# Patient Record
Sex: FEMALE | Race: WHITE | NOT HISPANIC OR LATINO | Employment: FULL TIME | URBAN - METROPOLITAN AREA
[De-identification: names, ages, dates, MRNs, and addresses within clinical notes are randomized per-mention and may not be internally consistent; named-entity substitution may affect disease eponyms.]

---

## 2017-02-13 ENCOUNTER — HOSPITAL ENCOUNTER (OUTPATIENT)
Dept: FAMILY MEDICINE CLINIC | Facility: CLINIC | Age: 67
Setting detail: SPECIMEN
Discharge: HOME | End: 2017-02-13
Attending: FAMILY MEDICINE | Admitting: FAMILY MEDICINE

## 2017-02-13 LAB
ALBUMIN SERPL-MCNC: 3.8 G/DL (ref 3.5–4.8)
ALBUMIN/GLOB SERPL: 1.3 {RATIO} (ref 1–1.7)
ALP SERPL-CCNC: 88 IU/L (ref 32–91)
ALT SERPL-CCNC: 19 IU/L (ref 14–54)
ANION GAP SERPL CALC-SCNC: 10.9 MMOL/L (ref 10–20)
AST SERPL-CCNC: 21 IU/L (ref 15–41)
BILIRUB SERPL-MCNC: 0.7 MG/DL (ref 0.3–1.2)
BUN SERPL-MCNC: 13 MG/DL (ref 8–20)
BUN/CREAT SERPL: 21.7 (ref 5.4–26.2)
CALCIUM SERPL-MCNC: 9.5 MG/DL (ref 8.9–10.3)
CHLORIDE SERPL-SCNC: 105 MMOL/L (ref 101–111)
CHOLEST SERPL-MCNC: 153 MG/DL
CHOLEST/HDLC SERPL: 2.1 {RATIO}
CONV CO2: 29 MMOL/L (ref 22–32)
CONV LDL CHOLESTEROL DIRECT: 62 MG/DL (ref 0–100)
CONV TOTAL PROTEIN: 6.7 G/DL (ref 6.1–7.9)
CREAT UR-MCNC: 0.6 MG/DL (ref 0.4–1)
GLOBULIN UR ELPH-MCNC: 2.9 G/DL (ref 2.5–3.8)
GLUCOSE SERPL-MCNC: 156 MG/DL (ref 65–99)
HDLC SERPL-MCNC: 72 MG/DL
LDLC/HDLC SERPL: 0.9 {RATIO}
LIPID INTERPRETATION: NORMAL
POTASSIUM SERPL-SCNC: 3.9 MMOL/L (ref 3.6–5.1)
SODIUM SERPL-SCNC: 141 MMOL/L (ref 136–144)
TRIGL SERPL-MCNC: 84 MG/DL
VLDLC SERPL CALC-MCNC: 19.3 MG/DL

## 2017-08-25 ENCOUNTER — HOSPITAL ENCOUNTER (OUTPATIENT)
Dept: FAMILY MEDICINE CLINIC | Facility: CLINIC | Age: 67
Setting detail: SPECIMEN
Discharge: HOME | End: 2017-08-25
Attending: FAMILY MEDICINE | Admitting: FAMILY MEDICINE

## 2017-08-25 LAB
ALBUMIN SERPL-MCNC: 3.7 G/DL (ref 3.5–4.8)
ALBUMIN/GLOB SERPL: 1.1 {RATIO} (ref 1–1.7)
ALP SERPL-CCNC: 86 IU/L (ref 32–91)
ALT SERPL-CCNC: 18 IU/L (ref 14–54)
ANION GAP SERPL CALC-SCNC: 16.3 MMOL/L (ref 10–20)
AST SERPL-CCNC: 20 IU/L (ref 15–41)
BILIRUB SERPL-MCNC: 1 MG/DL (ref 0.3–1.2)
BUN SERPL-MCNC: 11 MG/DL (ref 8–20)
BUN/CREAT SERPL: 18.3 (ref 5.4–26.2)
CALCIUM SERPL-MCNC: 9.8 MG/DL (ref 8.9–10.3)
CHLORIDE SERPL-SCNC: 104 MMOL/L (ref 101–111)
CHOLEST SERPL-MCNC: 126 MG/DL
CHOLEST/HDLC SERPL: 2 {RATIO}
CONV CO2: 26 MMOL/L (ref 22–32)
CONV LDL CHOLESTEROL DIRECT: 52 MG/DL (ref 0–100)
CONV TOTAL PROTEIN: 7 G/DL (ref 6.1–7.9)
CREAT UR-MCNC: 0.6 MG/DL (ref 0.4–1)
GLOBULIN UR ELPH-MCNC: 3.3 G/DL (ref 2.5–3.8)
GLUCOSE SERPL-MCNC: 166 MG/DL (ref 65–99)
HDLC SERPL-MCNC: 62 MG/DL
LDLC/HDLC SERPL: 0.8 {RATIO}
LIPID INTERPRETATION: NORMAL
POTASSIUM SERPL-SCNC: 4.3 MMOL/L (ref 3.6–5.1)
SODIUM SERPL-SCNC: 142 MMOL/L (ref 136–144)
TRIGL SERPL-MCNC: 121 MG/DL
VLDLC SERPL CALC-MCNC: 12 MG/DL

## 2018-03-02 ENCOUNTER — HOSPITAL ENCOUNTER (OUTPATIENT)
Dept: FAMILY MEDICINE CLINIC | Facility: CLINIC | Age: 68
Setting detail: SPECIMEN
Discharge: HOME | End: 2018-03-02
Attending: FAMILY MEDICINE | Admitting: FAMILY MEDICINE

## 2018-03-02 LAB
AZTREONAM SUSC ISLT: NORMAL
BACTERIA ISLT: NORMAL
BACTERIA SPEC AEROBE CULT: NORMAL
CEFAZOLIN SUSC ISLT: NORMAL
CEFEPIME SUSC ISLT: NORMAL
CEFTRIAXONE SUSC ISLT: NORMAL
CIPROFLOXACIN SUSC ISLT: NORMAL
COLONY COUNT: NORMAL
ERTAPENEM SUSC ISLT: NORMAL
LEVOFLOXACIN SUSC ISLT: NORMAL
Lab: NORMAL
MEROPENEM SUSC ISLT: NORMAL
MICRO REPORT STATUS: NORMAL
NITROFURANTOIN SUSC ISLT: NORMAL
PIP+TAZO SUSC ISLT: NORMAL
SPECIMEN SOURCE: NORMAL
SUSC METH SPEC: NORMAL
TETRACYCLINE SUSC ISLT: NORMAL
TOBRAMYCIN SUSC ISLT: NORMAL
TRIMETHOPRIM/SULFA: NORMAL

## 2018-08-20 ENCOUNTER — HOSPITAL ENCOUNTER (OUTPATIENT)
Dept: FAMILY MEDICINE CLINIC | Facility: CLINIC | Age: 68
Setting detail: SPECIMEN
Discharge: HOME | End: 2018-08-20
Attending: FAMILY MEDICINE | Admitting: FAMILY MEDICINE

## 2018-08-20 LAB
ALBUMIN SERPL-MCNC: 3.7 G/DL (ref 3.5–4.8)
ALBUMIN/GLOB SERPL: 1.1 {RATIO} (ref 1–1.7)
ALP SERPL-CCNC: 87 IU/L (ref 32–91)
ALT SERPL-CCNC: 19 IU/L (ref 14–54)
ANION GAP SERPL CALC-SCNC: 13.8 MMOL/L (ref 10–20)
AST SERPL-CCNC: 22 IU/L (ref 15–41)
BILIRUB SERPL-MCNC: 0.9 MG/DL (ref 0.3–1.2)
BUN SERPL-MCNC: 10 MG/DL (ref 8–20)
BUN/CREAT SERPL: 16.7 (ref 5.4–26.2)
CALCIUM SERPL-MCNC: 9.4 MG/DL (ref 8.9–10.3)
CHLORIDE SERPL-SCNC: 100 MMOL/L (ref 101–111)
CHOLEST SERPL-MCNC: 140 MG/DL
CHOLEST/HDLC SERPL: 2.2 {RATIO}
CONV CO2: 28 MMOL/L (ref 22–32)
CONV LDL CHOLESTEROL DIRECT: 64 MG/DL (ref 0–100)
CONV TOTAL PROTEIN: 7.2 G/DL (ref 6.1–7.9)
CREAT UR-MCNC: 0.6 MG/DL (ref 0.4–1)
GLOBULIN UR ELPH-MCNC: 3.5 G/DL (ref 2.5–3.8)
GLUCOSE SERPL-MCNC: 170 MG/DL (ref 65–99)
HDLC SERPL-MCNC: 63 MG/DL
LDLC/HDLC SERPL: 1 {RATIO}
LIPID INTERPRETATION: NORMAL
POTASSIUM SERPL-SCNC: 3.8 MMOL/L (ref 3.6–5.1)
SODIUM SERPL-SCNC: 138 MMOL/L (ref 136–144)
TRIGL SERPL-MCNC: 111 MG/DL
VLDLC SERPL CALC-MCNC: 13.2 MG/DL

## 2019-02-15 ENCOUNTER — HOSPITAL ENCOUNTER (OUTPATIENT)
Dept: FAMILY MEDICINE CLINIC | Facility: CLINIC | Age: 69
Setting detail: SPECIMEN
Discharge: HOME | End: 2019-02-15
Attending: FAMILY MEDICINE | Admitting: FAMILY MEDICINE

## 2019-02-15 LAB
ALBUMIN SERPL-MCNC: 3.8 G/DL (ref 3.5–4.8)
ALBUMIN/GLOB SERPL: 1.2 {RATIO} (ref 1–1.7)
ALP SERPL-CCNC: 80 IU/L (ref 32–91)
ALT SERPL-CCNC: 22 IU/L (ref 14–54)
ANION GAP SERPL CALC-SCNC: 13.6 MMOL/L (ref 10–20)
AST SERPL-CCNC: 26 IU/L (ref 15–41)
BILIRUB SERPL-MCNC: 0.8 MG/DL (ref 0.3–1.2)
BUN SERPL-MCNC: 9 MG/DL (ref 8–20)
BUN/CREAT SERPL: 12.9 (ref 5.4–26.2)
CALCIUM SERPL-MCNC: 9.3 MG/DL (ref 8.9–10.3)
CHLORIDE SERPL-SCNC: 103 MMOL/L (ref 101–111)
CHOLEST SERPL-MCNC: 143 MG/DL
CHOLEST/HDLC SERPL: 2.2 {RATIO}
CONV CO2: 25 MMOL/L (ref 22–32)
CONV LDL CHOLESTEROL DIRECT: 65 MG/DL (ref 0–100)
CONV TOTAL PROTEIN: 7 G/DL (ref 6.1–7.9)
CREAT UR-MCNC: 0.7 MG/DL (ref 0.4–1)
GLOBULIN UR ELPH-MCNC: 3.2 G/DL (ref 2.5–3.8)
GLUCOSE SERPL-MCNC: 156 MG/DL (ref 65–99)
HDLC SERPL-MCNC: 65 MG/DL
LDLC/HDLC SERPL: 1 {RATIO}
LIPID INTERPRETATION: NORMAL
POTASSIUM SERPL-SCNC: 3.6 MMOL/L (ref 3.6–5.1)
SODIUM SERPL-SCNC: 138 MMOL/L (ref 136–144)
TRIGL SERPL-MCNC: 126 MG/DL
VLDLC SERPL CALC-MCNC: 12.7 MG/DL

## 2019-08-16 ENCOUNTER — OFFICE VISIT (OUTPATIENT)
Dept: FAMILY MEDICINE CLINIC | Facility: CLINIC | Age: 69
End: 2019-08-16

## 2019-08-16 VITALS
BODY MASS INDEX: 36.57 KG/M2 | SYSTOLIC BLOOD PRESSURE: 192 MMHG | DIASTOLIC BLOOD PRESSURE: 92 MMHG | WEIGHT: 233 LBS | HEIGHT: 67 IN | TEMPERATURE: 97.7 F | OXYGEN SATURATION: 100 % | HEART RATE: 91 BPM

## 2019-08-16 DIAGNOSIS — J01.00 ACUTE NON-RECURRENT MAXILLARY SINUSITIS: Primary | ICD-10-CM

## 2019-08-16 DIAGNOSIS — E11.8 DISORDER ASSOCIATED WITH TYPE 2 DIABETES MELLITUS (HCC): ICD-10-CM

## 2019-08-16 DIAGNOSIS — I10 HYPERTENSION, UNSPECIFIED TYPE: ICD-10-CM

## 2019-08-16 PROBLEM — Z00.00 ENCOUNTER FOR GENERAL ADULT MEDICAL EXAMINATION WITHOUT ABNORMAL FINDINGS: Status: ACTIVE | Noted: 2017-08-25

## 2019-08-16 PROBLEM — K21.9 GASTROESOPHAGEAL REFLUX DISEASE: Status: ACTIVE | Noted: 2019-08-16

## 2019-08-16 LAB
ALBUMIN SERPL-MCNC: 4 G/DL (ref 3.5–4.8)
ALBUMIN/GLOB SERPL: 1.5 G/DL (ref 1–1.7)
ALP SERPL-CCNC: 70 U/L (ref 32–91)
ALT SERPL W P-5'-P-CCNC: 14 U/L (ref 14–54)
ANION GAP SERPL CALCULATED.3IONS-SCNC: 17 MMOL/L (ref 5–15)
ARTICHOKE IGE QN: 59 MG/DL (ref 0–100)
AST SERPL-CCNC: 18 U/L (ref 15–41)
BILIRUB SERPL-MCNC: 1 MG/DL (ref 0.3–1.2)
BUN BLD-MCNC: 9 MG/DL (ref 8–20)
BUN/CREAT SERPL: 15 (ref 5.4–26.2)
CALCIUM SPEC-SCNC: 9.1 MG/DL (ref 8.9–10.3)
CHLORIDE SERPL-SCNC: 102 MMOL/L (ref 101–111)
CHOLEST SERPL-MCNC: 136 MG/DL
CO2 SERPL-SCNC: 24 MMOL/L (ref 22–32)
CREAT BLD-MCNC: 0.6 MG/DL (ref 0.4–1)
GFR SERPL CREATININE-BSD FRML MDRD: 99 ML/MIN/1.73
GLOBULIN UR ELPH-MCNC: 2.7 GM/DL (ref 2.5–3.8)
GLUCOSE BLD-MCNC: 173 MG/DL (ref 65–99)
HBA1C MFR BLD: 6.5 % (ref 3.5–5.6)
HDLC SERPL QL: 2.09
HDLC SERPL-MCNC: 65 MG/DL
LDLC/HDLC SERPL: 0.74 {RATIO}
POTASSIUM BLD-SCNC: 4 MMOL/L (ref 3.6–5.1)
PROT SERPL-MCNC: 6.7 G/DL (ref 6.1–7.9)
SODIUM BLD-SCNC: 139 MMOL/L (ref 136–144)
TRIGL SERPL-MCNC: 113 MG/DL
VLDLC SERPL-MCNC: 22.6 MG/DL

## 2019-08-16 PROCEDURE — 80061 LIPID PANEL: CPT | Performed by: FAMILY MEDICINE

## 2019-08-16 PROCEDURE — 80053 COMPREHEN METABOLIC PANEL: CPT | Performed by: FAMILY MEDICINE

## 2019-08-16 PROCEDURE — 83036 HEMOGLOBIN GLYCOSYLATED A1C: CPT | Performed by: FAMILY MEDICINE

## 2019-08-16 PROCEDURE — 36415 COLL VENOUS BLD VENIPUNCTURE: CPT | Performed by: FAMILY MEDICINE

## 2019-08-16 PROCEDURE — 99214 OFFICE O/P EST MOD 30 MIN: CPT | Performed by: FAMILY MEDICINE

## 2019-08-16 RX ORDER — CARVEDILOL 12.5 MG/1
12.5 TABLET ORAL 2 TIMES DAILY WITH MEALS
Qty: 180 TABLET | Refills: 3 | Status: SHIPPED | OUTPATIENT
Start: 2019-08-16 | End: 2020-06-26

## 2019-08-16 RX ORDER — ATORVASTATIN CALCIUM 10 MG/1
TABLET, FILM COATED ORAL
COMMUNITY
Start: 2018-02-07 | End: 2020-01-14

## 2019-08-16 RX ORDER — LANCETS
EACH MISCELLANEOUS
COMMUNITY
Start: 2019-02-08 | End: 2020-06-12

## 2019-08-16 RX ORDER — CARVEDILOL 6.25 MG/1
TABLET ORAL EVERY 12 HOURS
COMMUNITY
Start: 2018-08-20 | End: 2019-08-16

## 2019-08-16 RX ORDER — GUAIFENESIN AND CODEINE PHOSPHATE 100; 10 MG/5ML; MG/5ML
5 SOLUTION ORAL 4 TIMES DAILY PRN
Qty: 180 ML | Refills: 0 | Status: SHIPPED | OUTPATIENT
Start: 2019-08-16 | End: 2020-01-06

## 2019-08-16 RX ORDER — BLOOD-GLUCOSE METER
EACH MISCELLANEOUS
COMMUNITY
Start: 2017-07-26 | End: 2020-06-19 | Stop reason: SDUPTHER

## 2019-08-16 RX ORDER — CEFDINIR 300 MG/1
300 CAPSULE ORAL 2 TIMES DAILY
Qty: 20 CAPSULE | Refills: 0 | Status: SHIPPED | OUTPATIENT
Start: 2019-08-16 | End: 2020-01-06

## 2019-08-16 NOTE — PROGRESS NOTES
Subjective   Chief Complaint   Patient presents with   • Hypertension     6 mos f/u     Reyna Castano is a 69 y.o. female.     Hypertension   This is a chronic problem. Associated symptoms include headaches. Pertinent negatives include no blurred vision, chest pain, palpitations, peripheral edema or shortness of breath. Current antihypertension treatment includes beta blockers. The current treatment provides mild improvement. There are no compliance problems.  There is no history of angina or heart failure.   Sinusitis   This is a new problem. The current episode started in the past 7 days. The problem has been gradually worsening since onset. The maximum temperature recorded prior to her arrival was 100.4 - 100.9 F. The fever has been present for 1 to 2 days. The pain is moderate. Associated symptoms include chills, coughing, headaches, sinus pressure and a sore throat. Pertinent negatives include no ear pain, shortness of breath, sneezing or swollen glands.   Diabetes   She presents for her follow-up diabetic visit. She has type 2 diabetes mellitus. Her disease course has been improving. Hypoglycemia symptoms include headaches. Pertinent negatives for hypoglycemia include no dizziness. Pertinent negatives for diabetes include no blurred vision, no chest pain and no polyuria. There are no hypoglycemic complications. There are no diabetic complications. Current diabetic treatment includes oral agent (dual therapy). She is compliant with treatment all of the time. Her weight is stable.      No past medical history on file.  No past surgical history on file.  Allergies   Allergen Reactions   • Erythromycin Diarrhea   • Penicillin G Unknown (See Comments)     Social History     Socioeconomic History   • Marital status:      Spouse name: Not on file   • Number of children: Not on file   • Years of education: Not on file   • Highest education level: Not on file   Tobacco Use   • Smoking status: Never Smoker   •  Smokeless tobacco: Never Used   Substance and Sexual Activity   • Alcohol use: No     Frequency: Never     Comment: caffeine free drinks     Social History     Tobacco Use   Smoking Status Never Smoker   Smokeless Tobacco Never Used       family history is not on file.  Current Outpatient Medications on File Prior to Visit   Medication Sig Dispense Refill   • ACCU-CHEK SOFTCLIX LANCETS lancets ACCU-CHEK SOFTCLIX LANCETS     • atorvastatin (LIPITOR) 10 MG tablet LIPITOR 10 MG TABS     • Blood Glucose Monitoring Suppl (ACCU-CHEK JOHN PLUS) w/Device kit ACCU-CHEK JOHN PLUS w/Device KIT     • glucose blood (ACCU-CHEK JOHN PLUS) test strip ACCU-CHEK JOHN PLUS STRP     • linagliptin (TRADJENTA) 5 MG tablet tablet TRADJENTA 5 MG TABS     • metFORMIN (GLUCOPHAGE) 500 MG tablet Every 12 (Twelve) Hours.     • [DISCONTINUED] carvedilol (COREG) 6.25 MG tablet Every 12 (Twelve) Hours.       No current facility-administered medications on file prior to visit.      Patient Active Problem List   Diagnosis   • Disorder associated with type 2 diabetes mellitus (CMS/[facility])   • Encounter for general adult medical examination without abnormal findings   • Gastroesophageal reflux disease   • Hypertension   • Hypokalemia   • Acute non-recurrent maxillary sinusitis       The following portions of the patient's history were reviewed and updated as appropriate: allergies, current medications, past family history, past medical history, past social history, past surgical history and problem list.    Review of Systems   Constitutional: Positive for chills. Negative for fever.   HENT: Positive for sinus pressure and sore throat. Negative for ear pain, sneezing and swollen glands.    Eyes: Negative for blurred vision.   Respiratory: Positive for cough. Negative for shortness of breath.    Cardiovascular: Negative for chest pain and palpitations.   Gastrointestinal: Negative for abdominal pain.   Endocrine: Negative for polyuria.   Skin:  "Negative for rash.   Neurological: Negative for dizziness and headache.   Hematological: Negative for adenopathy.   Psychiatric/Behavioral: Negative for depressed mood.       Objective   BP (!) 192/92 (BP Location: Left arm, Patient Position: Sitting, Cuff Size: Adult) Comment (Cuff Size): forearm  Pulse 91   Temp 97.7 °F (36.5 °C) (Oral)   Ht 168.9 cm (66.5\")   Wt 106 kg (233 lb)   SpO2 100%   BMI 37.04 kg/m²   Physical Exam   Constitutional: She is oriented to person, place, and time. She appears well-developed. No distress.   HENT:   Head: Normocephalic.   Mouth/Throat: Oropharynx is clear and moist.   Eyes: Conjunctivae and lids are normal.   Neck: Trachea normal. No thyroid mass and no thyromegaly present.   Cardiovascular: Normal rate, regular rhythm and normal heart sounds.   Pulmonary/Chest: Effort normal and breath sounds normal.   Lymphadenopathy:     She has no cervical adenopathy.   Neurological: She is alert and oriented to person, place, and time.   Skin: Skin is warm and dry.   Psychiatric: She has a normal mood and affect. Her speech is normal and behavior is normal. She is attentive.       No visits with results within 1 Week(s) from this visit.   Latest known visit with results is:   Hospital Outpatient Visit on 02/15/2019   Component Date Value Ref Range Status   • Total Cholesterol 02/15/2019 143  <200 mg/dL Final   • Triglycerides 02/15/2019 126  <150 mg/dL Final   • HDL Cholesterol 02/15/2019 65  >39 mg/dL Final   • LDL Cholesterol  02/15/2019 65  0 - 100 mg/dL Final   • VLDL Cholesterol 02/15/2019 12.7  <21 mg/dL Final   • LDL/HDL Ratio 02/15/2019 1.0   Final   • Chol/HDL Ratio 02/15/2019 2.2   Final    (SEE BELOW)  The following guidelines have been recommended by the NCEP for -    • Lipid Interpretation 02/15/2019 Total Cholesterol, Total Triglycerides, LDL Cholesterol,and HDL Cholesterol:    Final   • Lipid Interpretation 02/15/2019 TOTAL CHOLESTEROL                    HDL CHOLESTEROL "  Desirable:              Final   • Lipid Interpretation 02/15/2019 <200 mg/dL     Low HDL:            <40 mg/dL  Borderline High:   200-239 mg/dL    Final   • Lipid Interpretation 02/15/2019    Normal:           40-60 mg/dL  High:           > or = 240 mg/dL       Final   • Lipid Interpretation 02/15/2019 Desirable:          >60 mg/dL  TOTAL TRIGLYCERIDE                   LDL   Final   • Lipid Interpretation 02/15/2019 CHOLESTEROL  Normal:               <150 mg/dL     Optimal:           <100 mg/dL   Final   • Lipid Interpretation 02/15/2019  Borderline High:   150-199 mg/dL     Low Risk:       100-129 mg/dL  High:        Final   • Lipid Interpretation 02/15/2019         200-499 mg/dL     Borderline High:130-159 mg/dL  Very High:      > or =   Final   • Lipid Interpretation 02/15/2019 500 mg/dL     High:           160-189 mg/dL                                       Final   • Lipid Interpretation 02/15/2019   Very High:   > or = 190 mg/dL  The following ratios of LDL to HDL and Total   Final   • Lipid Interpretation 02/15/2019 Cholesterol to HDL are for information only:  LDL/HDL RATIO                       Final   • Lipid Interpretation 02/15/2019    TOTAL CHOLESTEROL/HDL RATIO  Desirable:              <5           Low Risk:    Final   • Lipid Interpretation 02/15/2019      3.3-4.4   Optimal:        < or = 3.5           Average Risk:   4.4-7.1       Final   • Lipid Interpretation 02/15/2019                                    Medium Risk:    7.1-11                         Final   • Lipid Interpretation 02/15/2019                   High Risk:         >11      Final   • Sodium 02/15/2019 138  136 - 144 mmol/L Final   • Potassium 02/15/2019 3.6  3.6 - 5.1 mmol/L Final   • Chloride 02/15/2019 103  101 - 111 mmol/L Final   • CO2 02/15/2019 25  22 - 32 mmol/L Final   • Glucose 02/15/2019 156* 65 - 99 mg/dL Final   • BUN 02/15/2019 9  8 - 20 mg/dL Final   • Creatinine 02/15/2019 0.7  0.4 - 1.0 mg/dl Final   • Calcium  02/15/2019 9.3  8.9 - 10.3 mg/dL Final   • Total Protein 02/15/2019 7.0  6.1 - 7.9 g/dL Final   • Albumin 02/15/2019 3.8  3.5 - 4.8 g/dL Final   • Total Bilirubin 02/15/2019 0.8  0.3 - 1.2 mg/dL Final   • Alkaline Phosphatase 02/15/2019 80  32 - 91 IU/L Final   • AST (SGOT) 02/15/2019 26  15 - 41 IU/L Final   • ALT (SGPT) 02/15/2019 22  14 - 54 IU/L Final   • Anion Gap 02/15/2019 13.6  10 - 20 Final   • BUN/Creatinine Ratio 02/15/2019 12.9  5.4 - 26.2 Final   • GFR MDRD Non  02/15/2019 >60  >60 mL/min/1.73m2 Final   • GFR MDRD  02/15/2019 >60  >60 mL/min/1.73m2 Final   • Globulin 02/15/2019 3.2  2.5 - 3.8 G/dL Final   • A/G Ratio 02/15/2019 1.2  1.0 - 1.7 Final           Assessment/Plan   Problems Addressed this Visit        Cardiovascular and Mediastinum    Hypertension    Relevant Medications    carvedilol (COREG) 12.5 MG tablet    Other Relevant Orders    Hemoglobin A1c    Lipid Panel    Comprehensive Metabolic Panel    MicroAlbumin, Urine, Random - Urine, Clean Catch       Respiratory    Acute non-recurrent maxillary sinusitis - Primary    Relevant Medications    cefdinir (OMNICEF) 300 MG capsule    guaifenesin-codeine (GUAIFENESIN AC) 100-10 MG/5ML liquid       Endocrine    Disorder associated with type 2 diabetes mellitus (CMS/HCC)    Relevant Medications    linagliptin (TRADJENTA) 5 MG tablet tablet    metFORMIN (GLUCOPHAGE) 500 MG tablet    Other Relevant Orders    Hemoglobin A1c    Lipid Panel    Comprehensive Metabolic Panel    MicroAlbumin, Urine, Random - Urine, Clean Catch          Reyna was seen today for hypertension.    Diagnoses and all orders for this visit:    Acute non-recurrent maxillary sinusitis  -     cefdinir (OMNICEF) 300 MG capsule; Take 1 capsule by mouth 2 (Two) Times a Day.  -     guaifenesin-codeine (GUAIFENESIN AC) 100-10 MG/5ML liquid; Take 5 mL by mouth 4 (Four) Times a Day As Needed for Cough.    Hypertension, unspecified type  -     Hemoglobin  A1c  -     Lipid Panel  -     Comprehensive Metabolic Panel  -     MicroAlbumin, Urine, Random - Urine, Clean Catch    Disorder associated with type 2 diabetes mellitus (CMS/HCC)  -     Hemoglobin A1c  -     Lipid Panel  -     Comprehensive Metabolic Panel  -     MicroAlbumin, Urine, Random - Urine, Clean Catch    Other orders  -     carvedilol (COREG) 12.5 MG tablet; Take 1 tablet by mouth 2 (Two) Times a Day With Meals.

## 2019-09-20 ENCOUNTER — HOSPITAL ENCOUNTER (EMERGENCY)
Facility: HOSPITAL | Age: 69
Discharge: HOME | End: 2019-09-20
Attending: EMERGENCY MEDICINE | Admitting: EMERGENCY MEDICINE

## 2019-09-20 ENCOUNTER — APPOINTMENT (OUTPATIENT)
Dept: MRI IMAGING | Facility: HOSPITAL | Age: 69
End: 2019-09-20

## 2019-09-20 VITALS
TEMPERATURE: 98 F | HEART RATE: 60 BPM | SYSTOLIC BLOOD PRESSURE: 150 MMHG | RESPIRATION RATE: 16 BRPM | OXYGEN SATURATION: 99 % | DIASTOLIC BLOOD PRESSURE: 78 MMHG | HEIGHT: 67 IN | WEIGHT: 236.11 LBS | BODY MASS INDEX: 37.06 KG/M2

## 2019-09-20 DIAGNOSIS — M54.16 LUMBAR BACK PAIN WITH RADICULOPATHY AFFECTING RIGHT LOWER EXTREMITY: Primary | ICD-10-CM

## 2019-09-20 LAB
BACTERIA UR QL AUTO: ABNORMAL /HPF
BILIRUB UR QL STRIP: NEGATIVE
CLARITY UR: CLEAR
COLOR UR: YELLOW
GLUCOSE UR STRIP-MCNC: ABNORMAL MG/DL
HGB UR QL STRIP.AUTO: ABNORMAL
HYALINE CASTS UR QL AUTO: ABNORMAL /LPF
KETONES UR QL STRIP: NEGATIVE
LEUKOCYTE ESTERASE UR QL STRIP.AUTO: NEGATIVE
NITRITE UR QL STRIP: NEGATIVE
PH UR STRIP.AUTO: 6.5 [PH] (ref 5–8)
PROT UR QL STRIP: ABNORMAL
RBC # UR: ABNORMAL /HPF
REF LAB TEST METHOD: ABNORMAL
SP GR UR STRIP: 1.01 (ref 1–1.03)
SQUAMOUS #/AREA URNS HPF: ABNORMAL /HPF
UROBILINOGEN UR QL STRIP: ABNORMAL
WBC UR QL AUTO: ABNORMAL /HPF

## 2019-09-20 PROCEDURE — 81001 URINALYSIS AUTO W/SCOPE: CPT | Performed by: EMERGENCY MEDICINE

## 2019-09-20 PROCEDURE — 96372 THER/PROPH/DIAG INJ SC/IM: CPT

## 2019-09-20 PROCEDURE — 25010000002 METHYLPREDNISOLONE PER 80 MG: Performed by: EMERGENCY MEDICINE

## 2019-09-20 PROCEDURE — 99284 EMERGENCY DEPT VISIT MOD MDM: CPT

## 2019-09-20 PROCEDURE — 72148 MRI LUMBAR SPINE W/O DYE: CPT

## 2019-09-20 RX ORDER — ONDANSETRON 4 MG/1
4 TABLET, ORALLY DISINTEGRATING ORAL ONCE
Status: COMPLETED | OUTPATIENT
Start: 2019-09-20 | End: 2019-09-20

## 2019-09-20 RX ORDER — HYDROCODONE BITARTRATE AND ACETAMINOPHEN 7.5; 325 MG/1; MG/1
1 TABLET ORAL ONCE
Status: COMPLETED | OUTPATIENT
Start: 2019-09-20 | End: 2019-09-20

## 2019-09-20 RX ORDER — HYDROCODONE BITARTRATE AND ACETAMINOPHEN 5; 325 MG/1; MG/1
1 TABLET ORAL EVERY 6 HOURS PRN
Qty: 12 TABLET | Refills: 0 | Status: SHIPPED | OUTPATIENT
Start: 2019-09-20 | End: 2019-09-30 | Stop reason: SDUPTHER

## 2019-09-20 RX ORDER — METHYLPREDNISOLONE ACETATE 80 MG/ML
80 INJECTION, SUSPENSION INTRA-ARTICULAR; INTRALESIONAL; INTRAMUSCULAR; SOFT TISSUE ONCE
Status: COMPLETED | OUTPATIENT
Start: 2019-09-20 | End: 2019-09-20

## 2019-09-20 RX ADMIN — METHYLPREDNISOLONE ACETATE 80 MG: 80 INJECTION, SUSPENSION INTRA-ARTICULAR; INTRALESIONAL; INTRAMUSCULAR; SOFT TISSUE at 10:17

## 2019-09-20 RX ADMIN — ONDANSETRON 4 MG: 4 TABLET, ORALLY DISINTEGRATING ORAL at 10:17

## 2019-09-20 RX ADMIN — HYDROCODONE BITARTRATE AND ACETAMINOPHEN 1 TABLET: 7.5; 325 TABLET ORAL at 10:17

## 2019-09-20 NOTE — ED PROVIDER NOTES
Subjective   Chief complaint back pain right leg pain    History of present illness 69-year-old female who states that since Monday when she got up and she did some leg lifts which she does every morning for several years she developed pain in her low back into the top of her right leg.  She states is steadily gotten worse since Monday is worse with movement better with rest but continuous moderate severe unrelieved with ibuprofen at home.  No loss of bladder bowel control no fever chills no chest pain no abdominal pain no black or bloody stool dizziness or syncope is constant worse with movement better with rest and down the right leg.            Review of Systems   Constitutional: Negative for chills and fever.   Respiratory: Negative for chest tightness and shortness of breath.    Cardiovascular: Negative for chest pain and leg swelling.   Gastrointestinal: Negative for abdominal pain, blood in stool and vomiting.   Genitourinary: Negative for difficulty urinating and dysuria.   Musculoskeletal: Positive for back pain. Negative for arthralgias.   Neurological: Negative for dizziness and numbness.   Psychiatric/Behavioral: Negative for agitation and behavioral problems.       Past Medical History:   Diagnosis Date   • Diabetes mellitus (CMS/HCC)    • Hypertension    • Injury of back      Previous back surgery  Allergies   Allergen Reactions   • Erythromycin Diarrhea   • Penicillin G Unknown (See Comments)       Past Surgical History:   Procedure Laterality Date   • BACK SURGERY     • COLONOSCOPY     • HYSTERECTOMY         History reviewed. No pertinent family history.    Social History     Socioeconomic History   • Marital status:      Spouse name: Not on file   • Number of children: Not on file   • Years of education: Not on file   • Highest education level: Not on file   Tobacco Use   • Smoking status: Never Smoker   • Smokeless tobacco: Never Used   Substance and Sexual Activity   • Alcohol use: No      Frequency: Never     Comment: caffeine free drinks       Prior to Admission medications    Medication Sig Start Date End Date Taking? Authorizing Provider   ACCU-CHEK SOFTCLIX LANCETS lancets ACCU-CHEK SOFTCLIX LANCETS 2/8/19   Janay Bull MD   atorvastatin (LIPITOR) 10 MG tablet LIPITOR 10 MG TABS 2/7/18   Janay Bull MD   Blood Glucose Monitoring Suppl (ACCU-CHEK JOHN PLUS) w/Device kit ACCU-CHEK JOHN PLUS w/Device KIT 7/26/17   Janay Bull MD   carvedilol (COREG) 12.5 MG tablet Take 1 tablet by mouth 2 (Two) Times a Day With Meals. 8/16/19   Keturah Grewal MD   cefdinir (OMNICEF) 300 MG capsule Take 1 capsule by mouth 2 (Two) Times a Day. 8/16/19   Keturah Grewal MD   glucose blood (ACCU-CHEK JOHN PLUS) test strip ACCU-CHEK JOHN PLUS STRP 9/10/18   Janay Bull MD   guaifenesin-codeine (GUAIFENESIN AC) 100-10 MG/5ML liquid Take 5 mL by mouth 4 (Four) Times a Day As Needed for Cough. 8/16/19   Keturah Grewal MD   linagliptin (TRADJENTA) 5 MG tablet tablet TRADJENTA 5 MG TABS 2/7/18   Janay Bull MD   metFORMIN (GLUCOPHAGE) 500 MG tablet Every 12 (Twelve) Hours. 2/7/18   Janay Bull MD         Objective   Physical Exam  69-year-old awake alert no acute distress HEENT extraocular intact sclera clear mouth clear neck supple no adenopathy lungs clear no retraction no CVA tenderness there is no direct cervical thoracic lumbar spine tenderness palpation because she has some pain in the posterior paralumbar area on the right posterior hip but no step-off or deformity the patient has no rash or signs of infection or signs of septic joint.  Lungs clear no retraction heart rate without murmur and was soft no tenderness pulsatile mass extremities pulses are equal throughout upper and lower extremities no edema cords or Homans sign or evidence of DVT.  Toes up-and-down including big toe dorsiflex plantar flex at difficulty negative straight leg testing  bilaterally reflexes are 2+ symmetrical both knees and ankles there is no weakness in extremities.  Patient is awake alert and follows commands motor strength normal without focal weakness  Procedures           ED Course      Results for orders placed or performed during the hospital encounter of 09/20/19   Urinalysis With Culture If Indicated - Urine, Random Void   Result Value Ref Range    Color, UA Yellow Yellow, Straw    Appearance, UA Clear Clear    pH, UA 6.5 5.0 - 8.0    Specific Gravity, UA 1.011 1.005 - 1.030    Glucose,  mg/dL (Trace) (A) Negative    Ketones, UA Negative Negative    Bilirubin, UA Negative Negative    Blood, UA Small (1+) (A) Negative    Protein,  mg/dL (2+) (A) Negative    Leuk Esterase, UA Negative Negative    Nitrite, UA Negative Negative    Urobilinogen, UA 0.2 E.U./dL 0.2 - 1.0 E.U./dL   Urinalysis, Microscopic Only - Urine, Random Void   Result Value Ref Range    RBC, UA 0-2 (A) None Seen /HPF    WBC, UA 0-2 (A) None Seen /HPF    Bacteria, UA None Seen None Seen /HPF    Squamous Epithelial Cells, UA None Seen None Seen, 0-2 /HPF    Hyaline Casts, UA 0-2 None Seen /LPF    Methodology Automated Microscopy      Mri Lumbar Spine Without Contrast    Result Date: 9/20/2019   1. L5-S1 osseous fusion. 2. Multilevel degenerative changes in the lumbar spine. Please refer to body of report for full description. 3. 2 mm retrolisthesis L2 on L3. 4 mm retrolisthesis L4 on L5. 4 Advanced degenerative disc and endplate changes at L2-3. 5. Lumbar dextroscoliosis. 6. Moderate canal stenosis at L3-4. 7. Suspected severe left neural foraminal stenosis at C2-3, moderate to severe right neural foraminal stenosis and moderate left neural foraminal stenosis at L3-4, mild to moderate right neural foraminal stenosis at L4-5. 8. Nonspecific T2 hyperintensity right hepatic lobe lesion. Consider correlation to CT or MRI abdomen without with contrast further evaluation.  Electronically Signed By-  Lucila Xie MD On:9/20/2019 12:52 PM This report was finalized on [id number] by Dr. Lucila Xie MD.    Medications   methylPREDNISolone acetate (DEPO-medrol) injection 80 mg (80 mg Intramuscular Given 9/20/19 1017)   HYDROcodone-acetaminophen (NORCO) 7.5-325 MG per tablet 1 tablet (1 tablet Oral Given 9/20/19 1017)   ondansetron ODT (ZOFRAN-ODT) disintegrating tablet 4 mg (4 mg Oral Given 9/20/19 1017)                   MDM  Number of Diagnoses or Management Options  Lumbar back pain with radiculopathy affecting right lower extremity:   Diagnosis management comments: Angella decision making.  Patient had the above exam the patient was given Depo-Medrol 80 mg IM Norco 1 p.o. Zofran 1 p.o. and had the above evaluation.  Urine showed 0-2 red cells otherwise negative.  Patient had MRI which showed that she had degenerative disc disease spinal stenosis postoperative changes.  No acute cord compression.  Patient repeat exam is resting currently.  Inspect was reviewed was negative I did some cough syrup she received last month.  The patient was made aware of the findings we talked about follow-up and need for potentially physical therapy epidural blocks versus conservative management talked about surgical management and referral she will be discharged home for outpatient management I see no evidence of acute intra-abdominal process.  No evidence of any acute cord compression or cauda equina.      Final diagnoses:   Lumbar back pain with radiculopathy affecting right lower extremity              Dinesh Baldwin MD  09/20/19 [id number]

## 2019-09-22 ENCOUNTER — HOSPITAL ENCOUNTER (EMERGENCY)
Facility: HOSPITAL | Age: 69
Discharge: HOME | End: 2019-09-22
Admitting: EMERGENCY MEDICINE

## 2019-09-22 VITALS
DIASTOLIC BLOOD PRESSURE: 70 MMHG | BODY MASS INDEX: 37.04 KG/M2 | HEIGHT: 67 IN | HEART RATE: 79 BPM | TEMPERATURE: 97.8 F | SYSTOLIC BLOOD PRESSURE: 170 MMHG | WEIGHT: 236 LBS | RESPIRATION RATE: 16 BRPM | OXYGEN SATURATION: 100 %

## 2019-09-22 DIAGNOSIS — M54.50 ACUTE RIGHT-SIDED LOW BACK PAIN WITHOUT SCIATICA: Primary | ICD-10-CM

## 2019-09-22 DIAGNOSIS — M79.604 RIGHT LEG PAIN: ICD-10-CM

## 2019-09-22 PROCEDURE — 25010000002 MORPHINE PER 10 MG: Performed by: EMERGENCY MEDICINE

## 2019-09-22 PROCEDURE — 99284 EMERGENCY DEPT VISIT MOD MDM: CPT

## 2019-09-22 PROCEDURE — 96372 THER/PROPH/DIAG INJ SC/IM: CPT

## 2019-09-22 RX ORDER — MELOXICAM 15 MG/1
15 TABLET ORAL DAILY
Qty: 30 TABLET | Refills: 0 | Status: SHIPPED | OUTPATIENT
Start: 2019-09-22 | End: 2020-01-06

## 2019-09-22 RX ORDER — MORPHINE SULFATE 4 MG/ML
4 INJECTION, SOLUTION INTRAMUSCULAR; INTRAVENOUS ONCE
Status: COMPLETED | OUTPATIENT
Start: 2019-09-22 | End: 2019-09-22

## 2019-09-22 RX ORDER — TRAMADOL HYDROCHLORIDE 50 MG/1
50 TABLET ORAL EVERY 6 HOURS PRN
Qty: 12 TABLET | Refills: 0 | Status: SHIPPED | OUTPATIENT
Start: 2019-09-22 | End: 2020-01-06

## 2019-09-22 RX ADMIN — MORPHINE SULFATE 4 MG: 4 INJECTION INTRAVENOUS at 09:48

## 2019-09-22 NOTE — DISCHARGE INSTRUCTIONS
Patient was advised to take tramadol as needed for pain.  Do not mix with hydrocodone or other pain medication.  Patient was also given meloxicam for anti-inflammatory.  Do not mix with ibuprofen, Aleve, Advil, naproxen or diclofenac.    Follow-up with primary care tomorrow for worsening concerns and management.  Call neuro spine surgery of choice to schedule an appointment this week for further evaluation and management.    Return to the ER for new or worsening symptoms, increased lower back or right leg pain, increased numbness and tingling, loss of bowel bladder or bowel function, nausea, vomiting or fever.

## 2019-09-22 NOTE — ED PROVIDER NOTES
"Subjective   Patient is a 69-year-old female history of diabetes, hypertension, previous back injury and surgery who presents the ER complaining of increased lower back and right leg pain 2 days.  Patient states that she was seen here at [facility] ER 2 days ago for right lower back pain and right leg pain.  She reports that she had an MRI done which showed a \"pinched nerve\".  He states that she was given a prednisone shot and discharged with hydrocodone, but she states that the pain medication is not helping and only makes her sleepy.  She states that she is here today because her pain has increased and it is hard for her to walk at home.  She states that she is planning to follow-up with her primary care tomorrow but has not called yet.  She verbalizes that she has a history of a back surgery at L1-S1 in 1983 but does not know what procedure she had.  She states her pain started a week ago mostly in the right lower back and right leg, mostly on the anterior lateral aspect of her thigh.  She reports no weakness, sensory or motor deficits in either lower extremity, only complaining of increased pain.  She has no other complaints at this time.        History provided by:  Patient      Review of Systems   Constitutional: Negative for fever.   HENT: Negative for congestion, sinus pressure, sore throat and trouble swallowing.    Respiratory: Negative for chest tightness, shortness of breath and wheezing.    Cardiovascular: Negative for chest pain.   Gastrointestinal: Negative for abdominal pain, diarrhea, nausea and vomiting.   Genitourinary: Negative for dysuria.   Musculoskeletal: Positive for arthralgias, back pain and myalgias. Negative for neck pain.        Right sided back pain, bilateral knee pain, right leg pain, mostly anterior-lateral region   Skin: Negative for rash.   Neurological: Negative for dizziness, syncope, weakness, light-headedness and headaches.   Psychiatric/Behavioral: Negative for " behavioral problems.       Past Medical History:   Diagnosis Date   • Diabetes mellitus (CMS/HCC)    • Hypertension    • Injury of back        Allergies   Allergen Reactions   • Erythromycin Diarrhea   • Penicillin G Unknown (See Comments)       Past Surgical History:   Procedure Laterality Date   • BACK SURGERY     • COLONOSCOPY     • HYSTERECTOMY         No family history on file.    Social History     Socioeconomic History   • Marital status:      Spouse name: Not on file   • Number of children: Not on file   • Years of education: Not on file   • Highest education level: Not on file   Tobacco Use   • Smoking status: Never Smoker   • Smokeless tobacco: Never Used   Substance and Sexual Activity   • Alcohol use: No     Frequency: Never     Comment: caffeine free drinks           Objective   Physical Exam   Constitutional: She is oriented to person, place, and time. She appears well-developed and well-nourished. No distress.   Obese   HENT:   Head: Normocephalic and atraumatic.   Mouth/Throat: Oropharynx is clear and moist.   Eyes: EOM are normal. Pupils are equal, round, and reactive to light.   Neck: Normal range of motion. Neck supple.   Cardiovascular: Normal rate, regular rhythm, normal heart sounds and intact distal pulses.   DP pulses intact and equal bilaterally   Pulmonary/Chest: Breath sounds normal. No respiratory distress. She has no wheezes. She exhibits no tenderness.   Abdominal: Soft. She exhibits no distension and no mass. There is no tenderness. There is no guarding.   Musculoskeletal: She exhibits edema and tenderness.   Tenderness to palpation of anterior right thigh.  Tenderness to palpation of right lumbosacral region.  Mild pain with flexion of right hip.  Patient states her range of motion is normal per her baseline  No pain with palpation of foot, ankle, calf or posterior aspect of thigh of bilateral lower extremities.  Homans sign negative   Neurological: She is alert and oriented to  "person, place, and time. She displays normal reflexes. No sensory deficit.   Sensory and motor strength in upper and lower extremities are appropriate, equal bilaterally 5/5.  Patient able to plantar and dorsiflex without pain.   Skin: Skin is warm and dry. Capillary refill takes less than 2 seconds. No rash noted. No erythema.   Venous varicosities bilateral lower extremities.  No erythema, warmth or ecchymosis noted in the calf or thigh.  No masses or nodules noted.  Patient has diffuse edema in bilateral lower extremities but reports no changes or increased swelling per her baseline   Psychiatric: She has a normal mood and affect. Her behavior is normal.   Vitals reviewed.      Procedures           ED Course  ED Course as of Sep 22 1051   Sun Sep 22, 2019   1008 MRI imaging from previous ER visit 2 days ago was reviewed:  IMPRESSION:     1. L5-S1 osseous fusion.  2. Multilevel degenerative changes in the lumbar spine. Please refer to  body of report for full description.  3. 2 mm retrolisthesis L2 on L3. 4 mm retrolisthesis L4 on L5.  4 Advanced degenerative disc and endplate changes at L2-3.  5. Lumbar dextroscoliosis.  6. Moderate canal stenosis at L3-4.  7. Suspected severe left neural foraminal stenosis at C2-3, moderate to  severe right neural foraminal stenosis and moderate left neural  foraminal stenosis at L3-4, mild to moderate right neural foraminal  stenosis at L4-5.  8. Nonspecific T2 hyperintensity right hepatic lobe lesion. Consider  correlation to CT or MRI abdomen without with contrast further  evaluation.     Electronically Signed By-Dr. Lucila Xie MD On:9/20/2019 12:52 PM  This report was finalized on [id number] by Dr. Lucila Xie MD.  [MM]      ED Course User Index  [MM] Debbie Álvarez PA      BP (!) 184/56   Pulse 72   Temp 97.8 °F (36.6 °C) (Oral)   Resp 16   Ht 170.2 cm (67\")   Wt 107 kg (236 lb)   SpO2 98%   BMI 36.96 kg/m²   Labs Reviewed - No data to " display  Medications   Morphine sulfate (PF) injection 4 mg (4 mg Intramuscular Given 9/22/19 0948)     Mri Lumbar Spine Without Contrast    Result Date: 9/20/2019   1. L5-S1 osseous fusion. 2. Multilevel degenerative changes in the lumbar spine. Please refer to body of report for full description. 3. 2 mm retrolisthesis L2 on L3. 4 mm retrolisthesis L4 on L5. 4 Advanced degenerative disc and endplate changes at L2-3. 5. Lumbar dextroscoliosis. 6. Moderate canal stenosis at L3-4. 7. Suspected severe left neural foraminal stenosis at C2-3, moderate to severe right neural foraminal stenosis and moderate left neural foraminal stenosis at L3-4, mild to moderate right neural foraminal stenosis at L4-5. 8. Nonspecific T2 hyperintensity right hepatic lobe lesion. Consider correlation to CT or MRI abdomen without with contrast further evaluation.  Electronically Signed By-Dr. Lucila Xie MD On:9/20/2019 12:52 PM This report was finalized on [id number] by Dr. Lucila Xie MD.                MDM  Number of Diagnoses or Management Options  Acute right-sided low back pain without sciatica:   Right leg pain:   Diagnosis management comments: MEDICAL DECISION  Comorbidities:  Differentials:  ; this list is not all inclusive and does not constitute the entirety of considered causes  Radiology interpretation:  Images reviewed by me and interpreted by radiologist,   MRI Lumbar spine done Friday Sept 20, during ER visit  IMPRESSION:     1. L5-S1 osseous fusion.  2. Multilevel degenerative changes in the lumbar spine. Please refer to  body of report for full description.  3. 2 mm retrolisthesis L2 on L3. 4 mm retrolisthesis L4 on L5.  4 Advanced degenerative disc and endplate changes at L2-3.  5. Lumbar dextroscoliosis.  6. Moderate canal stenosis at L3-4.  7. Suspected severe left neural foraminal stenosis at C2-3, moderate to  severe right neural foraminal stenosis and moderate left neural  foraminal stenosis at L3-4, mild  to moderate right neural foraminal  stenosis at L4-5.  8. Nonspecific T2 hyperintensity right hepatic lobe lesion. Consider  correlation to CT or MRI abdomen without with contrast further  evaluation.     Electronically Signed By-Dr. Lucila Xie MD On:9/20/2019 12:52 PM  This report was finalized on [id number] by Dr. Lucila Xie MD.    Patient was seen and evaluated in the ER today.  Patient is complaining of worsening right lower back pain and right leg pain today, mostly anterior lateral region.  She denies any numbness or tingling.  She denies any sensory or motor deficits or weakness.  Patient was given morphine injection for pain.  She states that this did not seem to help with her pain, only made her drowsy..  Discussed her MRI imaging with her.  Advised patient that there is no other imaging that would be beneficial for her at this time.  Recommended that she follow-up with her primary care provider tomorrow as well as neuro spine, Dr. Alexander, for further evaluation and treatment.  Patient will be discharged with short-term prescription of tramadol as well as meloxicam. Patient was agreeable with this plan. Patient symptoms and MRI imaging was discussed with Dr. Baldwin, who saw the patient during her ER visit 2 days ago, who also agreed with discharge plan.  Patient was also advised to follow-up with primary care provider within the next 4 weeks for blood pressure recheck as she had high blood pressure while here in the ER.  Patient was stable throughout discharge.      Final diagnoses:   Acute right-sided low back pain without sciatica   Right leg pain              Debbie Álvarez PA  09/22/19 9885

## 2019-09-23 ENCOUNTER — TELEPHONE (OUTPATIENT)
Dept: FAMILY MEDICINE CLINIC | Facility: CLINIC | Age: 69
End: 2019-09-23

## 2019-09-23 ENCOUNTER — OFFICE VISIT (OUTPATIENT)
Dept: FAMILY MEDICINE CLINIC | Facility: CLINIC | Age: 69
End: 2019-09-23

## 2019-09-23 VITALS
SYSTOLIC BLOOD PRESSURE: 186 MMHG | DIASTOLIC BLOOD PRESSURE: 84 MMHG | TEMPERATURE: 97.8 F | OXYGEN SATURATION: 97 % | HEART RATE: 88 BPM

## 2019-09-23 DIAGNOSIS — M48.061 NEURAL FORAMINAL STENOSIS OF LUMBAR SPINE: ICD-10-CM

## 2019-09-23 DIAGNOSIS — M43.10 RETROLISTHESIS OF VERTEBRAE: ICD-10-CM

## 2019-09-23 DIAGNOSIS — M48.061 NEURAL FORAMINAL STENOSIS OF LUMBAR SPINE: Primary | ICD-10-CM

## 2019-09-23 DIAGNOSIS — M43.10 RETROLISTHESIS OF VERTEBRAE: Primary | ICD-10-CM

## 2019-09-23 PROCEDURE — 99213 OFFICE O/P EST LOW 20 MIN: CPT | Performed by: FAMILY MEDICINE

## 2019-09-23 PROCEDURE — 99080 SPECIAL REPORTS OR FORMS: CPT | Performed by: FAMILY MEDICINE

## 2019-09-23 RX ORDER — PREDNISONE 10 MG/1
10 TABLET ORAL 2 TIMES DAILY
Qty: 10 TABLET | Refills: 0 | Status: SHIPPED | OUTPATIENT
Start: 2019-09-23 | End: 2020-01-06

## 2019-09-23 NOTE — TELEPHONE ENCOUNTER
Per pt she cannot get into Dr. Ho until Oct. 31st and she cannot wait that long. Is there anyone else that you can refer her to?

## 2019-09-23 NOTE — PROGRESS NOTES
Subjective   Chief Complaint   Patient presents with   • Follow-up     [facility] ER f/u     Reyna Castano is a 69 y.o. female.     She has been to the ER twice since last week.  No specific injury.  She did some routine mild exercises, leg lifts, and thinks it triggered the pain. MRI done in the ER is abnormal. Records reviewed.      Back Pain   This is a new problem. The current episode started in the past 7 days. The problem has been rapidly worsening since onset. The pain is present in the lumbar spine. The quality of the pain is described as shooting. The pain radiates to the right foot. The pain is severe. The pain is the same all the time. The symptoms are aggravated by bending. Pertinent negatives include no abdominal pain, chest pain or fever. She has tried NSAIDs and analgesics for the symptoms. The treatment provided mild relief.      Past Medical History:   Diagnosis Date   • Diabetes mellitus (CMS/HCC)    • Hypertension    • Injury of back      Past Surgical History:   Procedure Laterality Date   • BACK SURGERY     • COLONOSCOPY     • HYSTERECTOMY       Allergies   Allergen Reactions   • Erythromycin Diarrhea   • Penicillin G Unknown (See Comments)     Social History     Socioeconomic History   • Marital status:      Spouse name: Not on file   • Number of children: Not on file   • Years of education: Not on file   • Highest education level: Not on file   Tobacco Use   • Smoking status: Never Smoker   • Smokeless tobacco: Never Used   Substance and Sexual Activity   • Alcohol use: No     Frequency: Never     Comment: caffeine free drinks     Social History     Tobacco Use   Smoking Status Never Smoker   Smokeless Tobacco Never Used       family history is not on file.  Current Outpatient Medications on File Prior to Visit   Medication Sig Dispense Refill   • ACCU-CHEK SOFTCLIX LANCETS lancets ACCU-CHEK SOFTCLIX LANCETS     • atorvastatin (LIPITOR) 10 MG tablet LIPITOR 10 MG TABS     • Blood Glucose  Monitoring Suppl (ACCU-CHEK JOHN PLUS) w/Device kit ACCU-CHEK JOHN PLUS w/Device KIT     • carvedilol (COREG) 12.5 MG tablet Take 1 tablet by mouth 2 (Two) Times a Day With Meals. 180 tablet 3   • cefdinir (OMNICEF) 300 MG capsule Take 1 capsule by mouth 2 (Two) Times a Day. 20 capsule 0   • glucose blood (ACCU-CHEK JOHN PLUS) test strip ACCU-CHEK JOHN PLUS STRP     • guaifenesin-codeine (GUAIFENESIN AC) 100-10 MG/5ML liquid Take 5 mL by mouth 4 (Four) Times a Day As Needed for Cough. 180 mL 0   • HYDROcodone-acetaminophen (NORCO) 5-325 MG per tablet Take 1 tablet by mouth Every 6 (Six) Hours As Needed for Severe Pain . 12 tablet 0   • linagliptin (TRADJENTA) 5 MG tablet tablet TRADJENTA 5 MG TABS     • meloxicam (MOBIC) 15 MG tablet Take 1 tablet by mouth Daily. 30 tablet 0   • metFORMIN (GLUCOPHAGE) 500 MG tablet Every 12 (Twelve) Hours.     • traMADol (ULTRAM) 50 MG tablet Take 1 tablet by mouth Every 6 (Six) Hours As Needed for Moderate Pain . 12 tablet 0     No current facility-administered medications on file prior to visit.      Patient Active Problem List   Diagnosis   • Disorder associated with type 2 diabetes mellitus (CMS/[facility])   • Encounter for general adult medical examination without abnormal findings   • Gastroesophageal reflux disease   • Hypertension   • Hypokalemia   • Acute non-recurrent maxillary sinusitis   • Retrolisthesis of vertebrae   • Neural foraminal stenosis of lumbar spine       The following portions of the patient's history were reviewed and updated as appropriate: allergies, current medications, past family history, past medical history, past social history, past surgical history and problem list.    Review of Systems   Constitutional: Negative for chills and fever.   HENT: Negative for sinus pressure and sore throat.    Eyes: Negative for blurred vision.   Respiratory: Negative for cough and shortness of breath.    Cardiovascular: Negative for chest pain and palpitations.    Gastrointestinal: Negative for abdominal pain.   Endocrine: Negative for polyuria.   Musculoskeletal: Positive for back pain.   Skin: Negative for rash.   Neurological: Negative for dizziness and headache.   Hematological: Negative for adenopathy.   Psychiatric/Behavioral: Negative for depressed mood.       Objective   BP (!) 186/84 (BP Location: Left arm, Patient Position: Sitting, Cuff Size: Adult)   Pulse 88   Temp 97.8 °F (36.6 °C) (Oral)   SpO2 97%   Physical Exam   Constitutional: She appears well-developed and well-nourished.   In wheelchair today because she is unable to walk in to the office from the parking lot.   Cardiovascular: Normal rate and regular rhythm.   Pulmonary/Chest: Effort normal and breath sounds normal.   Musculoskeletal: She exhibits tenderness.        Lumbar back: She exhibits decreased range of motion, tenderness and pain.   Unable to fully assess due to patient's discomfort.   Neurological: She is alert.   Skin: Skin is warm.   Psychiatric: She has a normal mood and affect.       Admission on 09/20/2019, Discharged on 09/20/2019   Component Date Value Ref Range Status   • Color, UA 09/20/2019 Yellow  Yellow, Straw Final   • Appearance, UA 09/20/2019 Clear  Clear Final   • pH, UA 09/20/2019 6.5  5.0 - 8.0 Final   • Specific Gravity, UA 09/20/2019 1.011  1.005 - 1.030 Final   • Glucose, UA 09/20/2019 100 mg/dL (Trace)* Negative Final   • Ketones, UA 09/20/2019 Negative  Negative Final   • Bilirubin, UA 09/20/2019 Negative  Negative Final   • Blood, UA 09/20/2019 Small (1+)* Negative Final   • Protein, UA 09/20/2019 100 mg/dL (2+)* Negative Final   • Leuk Esterase, UA 09/20/2019 Negative  Negative Final   • Nitrite, UA 09/20/2019 Negative  Negative Final   • Urobilinogen, UA 09/20/2019 0.2 E.U./dL  0.2 - 1.0 E.U./dL Final   • RBC, UA 09/20/2019 0-2* None Seen /HPF Final   • WBC, UA 09/20/2019 0-2* None Seen /HPF Final   • Bacteria, UA 09/20/2019 None Seen  None Seen /HPF Final   •  Squamous Epithelial Cells, UA 09/20/2019 None Seen  None Seen, 0-2 /HPF Final   • Hyaline Casts, UA 09/20/2019 0-2  None Seen /LPF Final   • Methodology 09/20/2019 Automated Microscopy   Final           Assessment/Plan   Problems Addressed this Visit        Musculoskeletal and Integument    Retrolisthesis of vertebrae - Primary    Relevant Orders    Ambulatory Referral to Neurosurgery    Neural foraminal stenosis of lumbar spine    Relevant Orders    Ambulatory Referral to Neurosurgery          Reyna was seen today for follow-up.    Diagnoses and all orders for this visit:    Retrolisthesis of vertebrae  -     Ambulatory Referral to Neurosurgery    Neural foraminal stenosis of lumbar spine  -     Ambulatory Referral to Neurosurgery    Other orders  -     predniSONE (DELTASONE) 10 MG tablet; Take 1 tablet by mouth 2 (Two) Times a Day.

## 2019-09-24 NOTE — TELEPHONE ENCOUNTER
Pt lmom asking if you got her message and if you can start working on that because she did not sleep at all last night, but she did not a lot of crying.

## 2019-09-30 ENCOUNTER — TELEPHONE (OUTPATIENT)
Dept: FAMILY MEDICINE CLINIC | Facility: CLINIC | Age: 69
End: 2019-09-30

## 2019-09-30 RX ORDER — HYDROCODONE BITARTRATE AND ACETAMINOPHEN 5; 325 MG/1; MG/1
1 TABLET ORAL EVERY 6 HOURS PRN
Qty: 28 TABLET | Refills: 0 | Status: SHIPPED | OUTPATIENT
Start: 2019-09-30 | End: 2019-10-18 | Stop reason: SDUPTHER

## 2019-09-30 NOTE — TELEPHONE ENCOUNTER
Pt called stating that she has ran out of pain med. The Hydrocodone does not really help her pain but it does make her drowsy enough that she can get a couple of hrs of sleep at night. Will you send in some pain med for her? [facility]

## 2019-10-09 ENCOUNTER — TELEPHONE (OUTPATIENT)
Dept: FAMILY MEDICINE CLINIC | Facility: CLINIC | Age: 69
End: 2019-10-09

## 2019-10-09 NOTE — TELEPHONE ENCOUNTER
Pt called to let you know that seen Dr. Nuno and is going to try to refer her to Dr. Elier Chaudhry for epidural shots. She is now on STD and you will receive forms from [facility] Dr. Nuno told her that you will have to fill them out because she probably won't have to see him again. He will send you he ov note.

## 2019-10-11 NOTE — TELEPHONE ENCOUNTER
I have not received any new forms from [facility] or office note from Dr. Nuno.

## 2019-10-15 ENCOUNTER — TELEPHONE (OUTPATIENT)
Dept: FAMILY MEDICINE CLINIC | Facility: CLINIC | Age: 69
End: 2019-10-15

## 2019-10-16 NOTE — TELEPHONE ENCOUNTER
Pt notified and thinks the company sent FMLA forms instead of the STD forms. She will call them tomorrow.

## 2019-10-18 RX ORDER — HYDROCODONE BITARTRATE AND ACETAMINOPHEN 5; 325 MG/1; MG/1
1 TABLET ORAL EVERY 6 HOURS PRN
Qty: 28 TABLET | Refills: 0 | Status: SHIPPED | OUTPATIENT
Start: 2019-10-18 | End: 2019-12-03 | Stop reason: SDUPTHER

## 2019-10-23 ENCOUNTER — TELEPHONE (OUTPATIENT)
Dept: FAMILY MEDICINE CLINIC | Facility: CLINIC | Age: 69
End: 2019-10-23

## 2019-10-23 NOTE — TELEPHONE ENCOUNTER
Pt. Called wanting something for pain. Still having severe pain. Out of hydrocodone, and tramadol from ER.  Still has meloxicam, does not get steroid injection till 10/29/19. Appt. Nov 4 with surgeon. Can't sleep.  [phone]    She did find the pain medication was sent to [facility] in [place]. So she will pick it up.

## 2019-11-04 ENCOUNTER — OFFICE VISIT (OUTPATIENT)
Dept: NEUROSURGERY | Facility: CLINIC | Age: 69
End: 2019-11-04

## 2019-11-04 VITALS
SYSTOLIC BLOOD PRESSURE: 179 MMHG | HEIGHT: 67 IN | BODY MASS INDEX: 35.94 KG/M2 | HEART RATE: 84 BPM | WEIGHT: 229 LBS | DIASTOLIC BLOOD PRESSURE: 114 MMHG

## 2019-11-04 DIAGNOSIS — M48.061 NEURAL FORAMINAL STENOSIS OF LUMBAR SPINE: ICD-10-CM

## 2019-11-04 DIAGNOSIS — M43.10 RETROLISTHESIS OF VERTEBRAE: Primary | ICD-10-CM

## 2019-11-04 DIAGNOSIS — M51.36 DEGENERATIVE DISC DISEASE, LUMBAR: ICD-10-CM

## 2019-11-04 PROCEDURE — 96372 THER/PROPH/DIAG INJ SC/IM: CPT | Performed by: PHYSICIAN ASSISTANT

## 2019-11-04 PROCEDURE — 99203 OFFICE O/P NEW LOW 30 MIN: CPT | Performed by: PHYSICIAN ASSISTANT

## 2019-11-04 RX ORDER — GABAPENTIN 300 MG/1
300 CAPSULE ORAL
Qty: 60 CAPSULE | Refills: 0 | Status: SHIPPED | OUTPATIENT
Start: 2019-11-04 | End: 2019-11-25 | Stop reason: SDUPTHER

## 2019-11-04 RX ORDER — AMITRIPTYLINE HYDROCHLORIDE 25 MG/1
25 TABLET, FILM COATED ORAL NIGHTLY
Qty: 30 TABLET | Refills: 0 | Status: SHIPPED | OUTPATIENT
Start: 2019-11-04 | End: 2019-11-25 | Stop reason: SDUPTHER

## 2019-11-04 NOTE — PROGRESS NOTES
"Viridiana Castano is a 69 y.o. female.     Chief Complaint   Patient presents with   • Back Pain     lumbar pain since Sept 2019 following an exercise routine that included leg lifts     Visit Vitals  BP (!) 179/114 (BP Location: Left arm, Patient Position: Sitting, Cuff Size: Adult)   Pulse 84   Ht 170.2 cm (67\")   Wt 104 kg (229 lb)   BMI 35.87 kg/m²       History of Present Illness:  Ms. Reyna Castano is a 69-year-old female who presents to the office today as a new patient.  She was initially referred to a neurosurgeon by her primary care provider but was evaluated by Dr. Nuno who subsequently referred her to Dr. Ho.  Patient states that on September 16 she was doing leg lifts at home when she developed a severe pain in the right side of her lower back which subsequently turned into a pain sensation in her right thigh.  She denies any burning or numbness, it is all pain and weakness.  She has had to use a cane to ambulate for the past two months due to a feeling of weakness in her right lower extremity along with the pain. It never goes below her right knee.  Patient was initially evaluated in the emergency department she was given IM steroid injection with a Medrol Dosepak which did seem to help with her symptoms but they returned after the medications ran out.  She was also started on meloxicam daily.  She was referred to Dr. Martin of pain management for transforaminal blocks.  She had one on October 15 and a second 1 on October 30.  These lasted approximately 2 days each and the pain would always return.  Patient believes these were done at the L2-3 level. Pt denies any groin pain, urinary or bowel incontinence. Weight loss, fever, chills.     The following portions of the patient's history were reviewed and updated as appropriate: allergies, current medications, past family history, past medical history, past social history, past surgical history and problem list.    Review of Systems "   Constitutional: Negative for activity change, appetite change, chills, diaphoresis, fatigue, fever, unexpected weight gain and unexpected weight loss.   Respiratory: Negative for shortness of breath.    Cardiovascular: Negative for leg swelling.   Gastrointestinal: Negative for abdominal pain, nausea and vomiting.   Genitourinary: Negative for urinary incontinence.   Musculoskeletal: Positive for back pain and gait problem. Negative for neck pain.   Neurological: Positive for weakness. Negative for numbness and headache.   Psychiatric/Behavioral: Positive for sleep disturbance.         Past Surgical History:   Procedure Laterality Date   • BACK SURGERY     • COLONOSCOPY     • HYSTERECTOMY         Past Medical History:   Diagnosis Date   • Diabetes mellitus (CMS/[facility])    • Hypertension    • Injury of back        Objective   Physical Exam   Constitutional: She is oriented to person, place, and time. She appears well-developed.   HENT:   Head: Normocephalic.   Eyes: Pupils are equal, round, and reactive to light.   Neck: Normal range of motion.   Pulmonary/Chest: Effort normal.   Neurological: She is alert and oriented to person, place, and time.   Skin: Skin is warm and dry.   Psychiatric: She has a normal mood and affect.   Vitals reviewed.    Neurologic Exam     Mental Status   Oriented to person, place, and time.     Cranial Nerves     CN III, IV, VI   Pupils are equal, round, and reactive to light.    Motor Exam She is unable to squat with the right leg due to pain in her thigh.  She does have 4 out of 5 strength to the right lower extremity compared to 5 out of 5 left lower extremity.  Patient has mild right sacroiliac joint tenderness to palpation.  She has no midline tenderness to her lumbar spine and no tenderness to palpation of her right hip.     Sensory Exam   Light touch normal.     Gait, Coordination, and Reflexes Walks with a cane     Ortho Exam        Assessment and Plan:   Dr. Ho has evaluated the  patient with me today and reviewed the MRI of her lumbar spine along with the x-rays performed in the office today.  Based on his calculations she does have a retrolisthesis at L4-5 but this does not move between flexion and extension is a stable listhesis.  She does have signs of advanced degenerative changes throughout her lower thoracic and her lumbar spine but these appear stable.  She does appear to have a far lateral disc herniation at L3-4 on the right.  Majority of herniated discs will resolve on their own and at this time we have recommended maximizing her conservative treatment options before proceeding ahead with surgical intervention.    We do recommend the patient have blocks performed at the L3-4 level with Dr Martin.     We are going to for the patient with a referral to physical therapy to undergo lumbar traction.  We have given her exercises from the office today that she is to avoid doing along with normal body mechanics.    We are going to give her gabapentin that she is to take 300 mg at night.  After the first week if she does not have a significant amount of side effects from his medication she can increase this to twice a day.  We are also going to provide her with a prescription for amitriptyline 25 mg that she is to take at night.  She is to continue with the use of the meloxicam 15 mg every day.    We also provided the patient with an IM Depo-Medrol injection in the office today.  Her last one was performed approximately 2 months ago now and did provide her with significant amount of relief.    At this time she is going to follow-up in our office in 2 months after she has had an adequate amount of time with physical therapy and injections.  She can call our office for any worsening of her symptoms and come in sooner.    Problems Addressed this Visit        Musculoskeletal and Integument    Retrolisthesis of vertebrae - Primary    Relevant Orders    XR Spine Lumbar AP & Lateral With Flex & Ext  (Completed)    Ambulatory Referral to Pain Management (Completed)    Ambulatory Referral to Physical Therapy Evaluate and treat    Neural foraminal stenosis of lumbar spine    Relevant Orders    XR Spine Lumbar AP & Lateral With Flex & Ext (Completed)    Ambulatory Referral to Pain Management (Completed)    Ambulatory Referral to Physical Therapy Evaluate and treat      Other Visit Diagnoses     Degenerative disc disease, lumbar        Relevant Orders    XR Spine Lumbar AP & Lateral With Flex & Ext (Completed)    Ambulatory Referral to Pain Management (Completed)    Ambulatory Referral to Physical Therapy Evaluate and treat

## 2019-11-11 ENCOUNTER — OFFICE VISIT (OUTPATIENT)
Dept: FAMILY MEDICINE CLINIC | Facility: CLINIC | Age: 69
End: 2019-11-11

## 2019-11-11 ENCOUNTER — TREATMENT (OUTPATIENT)
Dept: PHYSICAL THERAPY | Facility: CLINIC | Age: 69
End: 2019-11-11

## 2019-11-11 VITALS
WEIGHT: 226 LBS | OXYGEN SATURATION: 97 % | SYSTOLIC BLOOD PRESSURE: 164 MMHG | DIASTOLIC BLOOD PRESSURE: 82 MMHG | BODY MASS INDEX: 35.47 KG/M2 | HEIGHT: 67 IN | RESPIRATION RATE: 16 BRPM | HEART RATE: 66 BPM | TEMPERATURE: 97.6 F

## 2019-11-11 DIAGNOSIS — M48.061 NEURAL FORAMINAL STENOSIS OF LUMBAR SPINE: ICD-10-CM

## 2019-11-11 DIAGNOSIS — E66.01 MORBIDLY OBESE (HCC): ICD-10-CM

## 2019-11-11 DIAGNOSIS — M43.10 RETROLISTHESIS OF VERTEBRAE: Primary | ICD-10-CM

## 2019-11-11 DIAGNOSIS — M51.36 DEGENERATIVE DISC DISEASE, LUMBAR: ICD-10-CM

## 2019-11-11 DIAGNOSIS — M48.061 NEUROFORAMINAL STENOSIS OF LUMBAR SPINE: ICD-10-CM

## 2019-11-11 PROCEDURE — 99213 OFFICE O/P EST LOW 20 MIN: CPT | Performed by: FAMILY MEDICINE

## 2019-11-11 PROCEDURE — 97140 MANUAL THERAPY 1/> REGIONS: CPT | Performed by: PHYSICAL THERAPIST

## 2019-11-11 PROCEDURE — 97110 THERAPEUTIC EXERCISES: CPT | Performed by: PHYSICAL THERAPIST

## 2019-11-11 PROCEDURE — 97162 PT EVAL MOD COMPLEX 30 MIN: CPT | Performed by: PHYSICAL THERAPIST

## 2019-11-11 PROCEDURE — 99080 SPECIAL REPORTS OR FORMS: CPT | Performed by: FAMILY MEDICINE

## 2019-11-11 NOTE — ASSESSMENT & PLAN NOTE
Slightly improved but still not well.  Follow up with physical therapy and pain management and neurosurgeon as planned.  FMLA form and short term disability form completed today.

## 2019-11-11 NOTE — PROGRESS NOTES
Physical Therapy Initial Evaluation and Plan of Care    Patient: Reyna Castano   : 1950  Diagnosis/ICD-10 Code:  Retrolisthesis of vertebrae [M43.10]  Referring practitioner: Jude Ho MD    Subjective Evaluation    History of Present Illness  Mechanism of injury: Pt reports that she has been having increased R sided low back and thigh pain that is very intense.  She had to go to the ER over it before, and has seen Dr. Nuno and Dr. Ho regarding her issue.  She has since been referred to pain management.  Has had 2 injections that have only lasted 2 days.  Will have another one tomorrow.  F/u with Dr. Ho scheduled 20.  Pt reports she was at home performing leg lifts and she felt pain in her low back/R thigh once she stood up.  Denies bowel/bladder changes.  No N/T reports.  Leg will give way on her at times, but has a h/o R knee issues also and needs a knee replacement.  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  She sits most of her day, but her pain has been so intense she has had difficulty concentrating on her work.  Sitting is better than standing.  She cannot sleep on her back or her R side.  Lays on her L side only, but it has resulted in a sore in her L ear.  Has used heat which does help some also.       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 )    Social Support  Lives with: spouse    Diagnostic Tests  X-ray: abnormal  MRI studies: abnormal (2 mm retrolisthesis L2 on L3. 4 mm retrolisthesis L4 on  L5.)    Treatments  Current treatment: injection treatment and medication  Patient Goals  Patient goals for therapy: decreased pain, improved balance, increased motion, increased strength, independence with ADLs/IADLs, return to work and return to sport/leisure activities         Objective     Special Questions  Patient is experiencing night pain and disturbed sleep.       Postural Observations  Seated posture: fair  Standing posture: poor  Correction of posture: makes symptoms worse    Additional Postural Observation Details  Pt has worsened pain when asked to improve posture to more upright position.     Palpation     Additional Palpation Details  Pt with increased TTP R>L lumbar paraspinals.  Pt with increased pain R sided facets at L4/5, L5/S1 with P-A and increased hypomobility present.  Pt with slight pain in interspinous region today and on L but R most involved.  Pt with tenderness present R QL, iliac crest, gluteal region, and lateral hip.  R thigh mild tenderness.  Not able to passively flex pt's RLE to 90 degs hip flex 2' pain in low back reproduced in supine position.    Tenderness     Lumbar Spine  Tenderness in the facet joint.     Neurological Testing     Sensation     Lumbar   Left   Intact: light touch    Right   Intact: light touch    Reflexes   Left   Patellar (L4): trace (1+)  Achilles (S1): absent (0)    Right   Patellar (L4): trace (1+)  Achilles (S1): absent (0)    Additional Neurological Details  Could not elicit reflex today.  Utilized Jendrassik maneuver to assist.  Pt reports M.D. Was able to get reflexes in office at ankles.     Active Range of Motion     Additional Active Range of Motion Details  Severe limitations grossly with extension, rotation, and side bending.  Moderate restrictions with flexion.  Pain all directions, but extension most painful for pt.  Cannot  full upright position.      Strength/Myotome Testing     Left Hip   Planes of Motion   Flexion: 3-  Extension:  3-  Abduction: 3-  Adduction: 3-  External rotation: 3-  Internal rotation: 3-    Right Hip   Planes of Motion   Flexion: 3-  Extension: 3-  Abduction: 3-  Adduction: 3-  External rotation: 3-  Internal rotation: 3-    Left Knee   Flexion: 4  Extension: 4+    Right Knee   Flexion: 4-  Extension: 4    Left Ankle/Foot   Dorsiflexion: 5  Eversion: 5    Right Ankle/Foot   Dorsiflexion: 4+  Eversion: 4+    Tests     Lumbar   Positive repeated extension.     Left   Negative crossed SLR and passive SLR.     Right   Positive quadrant.   Negative crossed SLR and passive SLR.     Ambulation     Comments   Pt ambulates with increased lateral displacement and slow gait speed.  Uses SPC and has difficulty placing full weight through RLE.      Functional Assessment     Comments  Bed mobility: mod I,but slow in rolling and supine to/from sit.  Has to assist with use of UE to get her legs onto and off of plinth.   Transfers: mod I, requires use of BUE to push up and slow in transition         Assessment & Plan     Assessment  Impairments: abnormal coordination, abnormal gait, abnormal muscle tone, abnormal or restricted ROM, activity intolerance, impaired balance, impaired physical strength, lacks appropriate home exercise program, pain with function and weight-bearing intolerance  Assessment details: Patient is a 69 y.o. female who presents to therapy with increased low back pain and R thigh pain that began after performing a leg raise exercises at home.  She presents with significant impairments including reduced AROM, impaired mobility, reduced strength and coordination, impaired gait mechanics and balance, and increased pain. Impairments affect ADL/IADL's, functional activities, recreational activities, work activities, and community activities. Pt will benefit from skilled physical therapy to address impairments, decrease pain and restore function.     Prognosis: good  Functional Limitations: lifting, sleeping, walking,  pushing, uncomfortable because of pain, moving in bed, sitting, standing and unable to perform repetitive tasks  Goals  Plan Goals: SHORT TERM GOALS: Time for Goal Achievement: 4 weeks    1.  Patient to be compliant and progression of HEP.                     2.  Pain level < 7/10 at worst with mentioned activities to improve function and ability to sit to perform job.  3.  Increased lumbar and SIJ mobility to allow for increased lumbar AROM with less pain.  4.  Increased lumbar AROM to by 25% in all planes to allow for increased ease with sit-stand transfers and functional activities.    LONG TERM GOALS: Time for Goal Achievement: 2 months  1.  Pt. to score < 40% on Back Index.   2.  Pain level < 4/10 with all listed activities to return to higher level of function.  3.  Lumbar AROM to WFL to allow for return to household, work, & recreational activities w/o increased symptoms.   4.  (B) LE and lower abdominal strength to 4+ to 5/5 to allow for pushing, pulling and activities to occur without pain (driving, sitting, household  & job requirements).  5. Pt to be able to ambulate without use of an AD for household and community distances with proper gait mechanics and min to no reports of R thigh pain.      Plan  Therapy options: will be seen for skilled physical therapy services  Planned modality interventions: cryotherapy, electrical stimulation/Russian stimulation, TENS, thermotherapy (hydrocollator packs) and traction  Planned therapy interventions: abdominal trunk stabilization, balance/weight-bearing training, flexibility, functional ROM exercises, gait training, home exercise program, joint mobilization, manual therapy, neuromuscular re-education, soft tissue mobilization, spinal/joint mobilization, strengthening, stretching and therapeutic activities  Frequency: 2x week  Duration in visits: 16  Treatment plan discussed with: patient  Plan details: Progress ROM/strengthening/stabilization/functional activity as  tolerated. Trial lumbar traction if pt can tolerate.           History # of Personal Factors and/or Comorbidities: MODERATE (1-2)  Examination of Body System(s): # of elements: MODERATE (3)  Clinical Presentation: EVOLVING  Clinical Decision Making: MODERATE    Timed:         Manual Therapy:    8     mins  15733;     Therapeutic Exercise:   15     mins  84888;     Neuromuscular Izabella:        mins  99971;    Therapeutic Activity:          mins  43090;     Gait Training:           mins  22254;     Ultrasound:          mins  62474;    Ionto                                   mins   96852  Self Care                            mins   31186        Un-Timed:  Electrical Stimulation:         mins  55285 ( );  Dry Needling          mins self-pay  Traction          mins 80004  Low Eval          Mins  91128  Mod Eval     32     Mins  12474  High Eval                            Mins  35935  Re-Eval                               mins  55988        Timed Treatment:   23   mins   Total Treatment:     55   mins  PT SIGNATURE: Katie Colmenares, PT, DPT    DATE TREATMENT INITIATED: 11/11/2019    Initial Certification  Certification Period: 2/9/2020  I certify that the therapy services are furnished while this patient is under my care.  The services outlined above are required by this patient, and will be reviewed every 90 days.     PHYSICIAN: Jude Ho MD      DATE:     Please sign and return via fax to [phone].. Thank you, [facility].

## 2019-11-11 NOTE — PROGRESS NOTES
Subjective   Chief Complaint   Patient presents with   • FMLA PAPERWORK     Reyna Castano is a 69 y.o. female.     She has had 2 epidural injections so far.  She got a few days of relief but the pain has returned.  She is scheduled for another epidural soon.  Scheduled to start Physical therapy today in [place]. She has a follow up appointment with the neurosurgeon in January 2020.       Back Pain   This is a recurrent problem. The current episode started more than 1 month ago. The problem occurs constantly. The problem is unchanged. The pain is present in the lumbar spine. The pain does not radiate. The pain is at a severity of 10/10. The pain is severe. The symptoms are aggravated by bending. Pertinent negatives include no abdominal pain, chest pain, fever, numbness or tingling. Treatments tried: epidural, gabapentin. The treatment provided mild relief.      Past Medical History:   Diagnosis Date   • Diabetes mellitus (CMS/HCC)    • Hypertension    • Injury of back      Past Surgical History:   Procedure Laterality Date   • BACK SURGERY     • COLONOSCOPY     • HYSTERECTOMY       Allergies   Allergen Reactions   • Erythromycin Diarrhea   • Penicillin G Unknown (See Comments)     Social History     Socioeconomic History   • Marital status:      Spouse name: Not on file   • Number of children: Not on file   • Years of education: Not on file   • Highest education level: Not on file   Tobacco Use   • Smoking status: Never Smoker   • Smokeless tobacco: Never Used   Substance and Sexual Activity   • Alcohol use: No     Frequency: Never     Comment: caffeine free drinks     Social History     Tobacco Use   Smoking Status Never Smoker   Smokeless Tobacco Never Used       family history is not on file.  Current Outpatient Medications on File Prior to Visit   Medication Sig Dispense Refill   • ACCU-CHEK SOFTCLIX LANCETS lancets ACCU-CHEK SOFTCLIX LANCETS     • amitriptyline (ELAVIL) 25 MG tablet Take 1 tablet by  mouth Every Night. 30 tablet 0   • atorvastatin (LIPITOR) 10 MG tablet LIPITOR 10 MG TABS     • Blood Glucose Monitoring Suppl (ACCU-CHEK JOHN PLUS) w/Device kit ACCU-CHEK JOHN PLUS w/Device KIT     • carvedilol (COREG) 12.5 MG tablet Take 1 tablet by mouth 2 (Two) Times a Day With Meals. 180 tablet 3   • cefdinir (OMNICEF) 300 MG capsule Take 1 capsule by mouth 2 (Two) Times a Day. 20 capsule 0   • gabapentin (NEURONTIN) 300 MG capsule Take 1 capsule by mouth every night at bedtime. May increase to BID as needed 60 capsule 0   • glucose blood (ACCU-CHEK JOHN PLUS) test strip ACCU-CHEK JOHN PLUS STRP     • guaifenesin-codeine (GUAIFENESIN AC) 100-10 MG/5ML liquid Take 5 mL by mouth 4 (Four) Times a Day As Needed for Cough. 180 mL 0   • HYDROcodone-acetaminophen (NORCO) 5-325 MG per tablet Take 1 tablet by mouth Every 6 (Six) Hours As Needed for Severe Pain . 28 tablet 0   • linagliptin (TRADJENTA) 5 MG tablet tablet TRADJENTA 5 MG TABS     • meloxicam (MOBIC) 15 MG tablet Take 1 tablet by mouth Daily. 30 tablet 0   • metFORMIN (GLUCOPHAGE) 500 MG tablet Every 12 (Twelve) Hours.     • predniSONE (DELTASONE) 10 MG tablet Take 1 tablet by mouth 2 (Two) Times a Day. 10 tablet 0   • traMADol (ULTRAM) 50 MG tablet Take 1 tablet by mouth Every 6 (Six) Hours As Needed for Moderate Pain . 12 tablet 0     No current facility-administered medications on file prior to visit.      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    Review of Systems   Constitutional: Negative for chills and fever.   HENT: Negative for sinus pressure and sore throat.    Eyes: Negative for blurred vision.   Respiratory: Negative for cough and shortness of breath.    Cardiovascular: Negative for chest pain and palpitations.   Gastrointestinal: Negative for abdominal pain.   Endocrine: Negative for polyuria.   Musculoskeletal: Positive for back pain.   Skin: Negative for rash.   Neurological: Negative for dizziness, tingling, numbness and headache.   Hematological: Negative for adenopathy.   Psychiatric/Behavioral: Negative for depressed mood.       Objective   /82 (BP Location: Left arm, Patient Position: Sitting, Cuff Size: Large Adult)   Pulse 66   Temp 97.6 °F (36.4 °C) (Oral)   Resp 16   Ht 170.2 cm (67\")   Wt 103 kg (226 lb)   SpO2 97%   BMI 35.40 kg/m²   Physical Exam   Constitutional: She is oriented to person, place, and time. She appears well-developed. No distress.   Walks with a cane   HENT:   Head: Normocephalic.   Eyes: Conjunctivae and lids are normal.   Neck: Trachea normal. No thyroid mass and no thyromegaly present.   Cardiovascular: Normal rate, regular rhythm and normal heart sounds.   Pulmonary/Chest: Effort normal and breath sounds normal.   Musculoskeletal: She exhibits tenderness.        Lumbar back: She exhibits decreased range of motion and tenderness.   Lymphadenopathy:     She has no cervical adenopathy.   Neurological: She is alert and oriented to person, place, and time.   Skin: Skin is warm and dry.   Psychiatric: She has a normal mood and affect. Her speech is normal and behavior is normal. She is attentive.   Nursing note and vitals reviewed.      No visits with results within 1 Week(s) from this visit.   Latest known visit with results is:   Admission on 09/20/2019, Discharged on 09/20/2019   Component Date Value Ref Range Status   • " Color, UA 09/20/2019 Yellow  Yellow, Straw Final   • Appearance, UA 09/20/2019 Clear  Clear Final   • pH, UA 09/20/2019 6.5  5.0 - 8.0 Final   • Specific Gravity, UA 09/20/2019 1.011  1.005 - 1.030 Final   • Glucose, UA 09/20/2019 100 mg/dL (Trace)* Negative Final   • Ketones, UA 09/20/2019 Negative  Negative Final   • Bilirubin, UA 09/20/2019 Negative  Negative Final   • Blood, UA 09/20/2019 Small (1+)* Negative Final   • Protein, UA 09/20/2019 100 mg/dL (2+)* Negative Final   • Leuk Esterase, UA 09/20/2019 Negative  Negative Final   • Nitrite, UA 09/20/2019 Negative  Negative Final   • Urobilinogen, UA 09/20/2019 0.2 E.U./dL  0.2 - 1.0 E.U./dL Final   • RBC, UA 09/20/2019 0-2* None Seen /HPF Final   • WBC, UA 09/20/2019 0-2* None Seen /HPF Final   • Bacteria, UA 09/20/2019 None Seen  None Seen /HPF Final   • Squamous Epithelial Cells, UA 09/20/2019 None Seen  None Seen, 0-2 /HPF Final   • Hyaline Casts, UA 09/20/2019 0-2  None Seen /LPF Final   • Methodology 09/20/2019 Automated Microscopy   Final           Assessment/Plan   Problems Addressed this Visit        Digestive    Morbidly obese (CMS/[facility])     Obesity is improving with lifestyle modifications.  Discussed the patient's BMI.  The BMI is above average; BMI management plan is completed.  General weight loss/lifestyle modification strategies discussed (elicit support from others; identify saboteurs; non-food rewards, etc).            Musculoskeletal and Integument    Retrolisthesis of vertebrae - Primary     Slightly improved but still not well.  Follow up with physical therapy and pain management and neurosurgeon as planned.           Neural foraminal stenosis of lumbar spine          Reyna was seen today for [facility] paperwork.    Diagnoses and all orders for this visit:    Retrolisthesis of vertebrae    Neural foraminal stenosis of lumbar spine    Morbidly obese (CMS/HCC)

## 2019-11-11 NOTE — ASSESSMENT & PLAN NOTE
Obesity is improving with lifestyle modifications.  Discussed the patient's BMI.  The BMI is above average; BMI management plan is completed.  General weight loss/lifestyle modification strategies discussed (elicit support from others; identify saboteurs; non-food rewards, etc).

## 2019-11-15 ENCOUNTER — TREATMENT (OUTPATIENT)
Dept: PHYSICAL THERAPY | Facility: CLINIC | Age: 69
End: 2019-11-15

## 2019-11-15 DIAGNOSIS — M43.10 RETROLISTHESIS OF VERTEBRAE: Primary | ICD-10-CM

## 2019-11-15 DIAGNOSIS — M48.061 NEURAL FORAMINAL STENOSIS OF LUMBAR SPINE: ICD-10-CM

## 2019-11-15 DIAGNOSIS — M51.36 DEGENERATIVE DISC DISEASE, LUMBAR: ICD-10-CM

## 2019-11-15 DIAGNOSIS — M48.061 NEUROFORAMINAL STENOSIS OF LUMBAR SPINE: ICD-10-CM

## 2019-11-15 PROCEDURE — 97012 MECHANICAL TRACTION THERAPY: CPT | Performed by: PHYSICAL THERAPIST

## 2019-11-15 PROCEDURE — 97014 ELECTRIC STIMULATION THERAPY: CPT | Performed by: PHYSICAL THERAPIST

## 2019-11-15 PROCEDURE — 97140 MANUAL THERAPY 1/> REGIONS: CPT | Performed by: PHYSICAL THERAPIST

## 2019-11-15 NOTE — PROGRESS NOTES
"Physical Therapy Daily Progress Note    VISIT#: 2/16 in POC.     Subjective   Reyna Castano reports she was sore after eval.  Hurting today and having symptoms in her leg.     Pain Rating (0/10): 6    Objective     See Exercise, Manual, and Modality Logs for complete treatment.     Patient Education: Discussed with pt use of traction/benefits.  Provided pt with \"kill switch\" for traction and notification button in case of any issue.     Assessment & Plan     Assessment  Assessment details: Pt with reduced pain RLE post traction.  No adverse reactions during or post traction trial today.         Plan  Continue current.             Timed:         Manual Therapy:    12     mins  46131;     Therapeutic Exercise:         mins  66435;     Neuromuscular Izabella:        mins  91632;    Therapeutic Activity:          mins  92892;     Gait Training:           mins  63650;     Ultrasound:          mins  70230;    Ionto                                   mins   37152  Self Care                            mins   79292    Un-Timed:  Electrical Stimulation:    15     mins  52909 ( );  Dry Needling          mins self-pay  Traction     13     mins 49933  Low Eval          Mins  12766  Mod Eval          Mins  25816  High Eval                            Mins  89066  Re-Eval                               mins  94441    Timed Treatment:   12   mins   Total Treatment:     40   mins    Katie Colmenares, PT, DPT  Physical Therapist  "

## 2019-11-18 ENCOUNTER — TREATMENT (OUTPATIENT)
Dept: PHYSICAL THERAPY | Facility: CLINIC | Age: 69
End: 2019-11-18

## 2019-11-18 DIAGNOSIS — M51.36 DEGENERATIVE DISC DISEASE, LUMBAR: ICD-10-CM

## 2019-11-18 DIAGNOSIS — M43.10 RETROLISTHESIS OF VERTEBRAE: Primary | ICD-10-CM

## 2019-11-18 DIAGNOSIS — M48.061 NEUROFORAMINAL STENOSIS OF LUMBAR SPINE: ICD-10-CM

## 2019-11-18 DIAGNOSIS — M48.061 NEURAL FORAMINAL STENOSIS OF LUMBAR SPINE: ICD-10-CM

## 2019-11-18 PROCEDURE — 97014 ELECTRIC STIMULATION THERAPY: CPT | Performed by: PHYSICAL THERAPIST

## 2019-11-18 PROCEDURE — 97140 MANUAL THERAPY 1/> REGIONS: CPT | Performed by: PHYSICAL THERAPIST

## 2019-11-18 PROCEDURE — 97012 MECHANICAL TRACTION THERAPY: CPT | Performed by: PHYSICAL THERAPIST

## 2019-11-18 NOTE — PROGRESS NOTES
Physical Therapy Daily Progress Note    VISIT#: 3/16 in POC.     Subjective   Reyna Castano reports she is still hurting today.  Felt that traction did help some after last visit.     Pain Rating (0/10): 6    Objective     See Exercise, Manual, and Modality Logs for complete treatment.    Assessment & Plan     Assessment  Assessment details: Positive response to traction again.  Will plan to continue traction next visit with addition of therex.        Plan  Progress per Plan of Care            Timed:         Manual Therapy:    12     mins  17081;     Therapeutic Exercise:         mins  28144;     Neuromuscular Izabella:        mins  51705;    Therapeutic Activity:          mins  27264;     Gait Training:           mins  43589;     Ultrasound:          mins  43093;    Ionto                                   mins   10080  Self Care                            mins   58197    Un-Timed:  Electrical Stimulation:    15     mins  14882 ( );  Dry Needling          mins self-pay  Traction     12     mins 59841  Low Eval          Mins  92870  Mod Eval          Mins  24996  High Eval                            Mins  11984  Re-Eval                               mins  83043    Timed Treatment:   12   mins   Total Treatment:     39   mins    Katie Colmenares, PT, DPT  Physical Therapist

## 2019-11-25 ENCOUNTER — TREATMENT (OUTPATIENT)
Dept: PHYSICAL THERAPY | Facility: CLINIC | Age: 69
End: 2019-11-25

## 2019-11-25 DIAGNOSIS — M43.10 RETROLISTHESIS OF VERTEBRAE: Primary | ICD-10-CM

## 2019-11-25 DIAGNOSIS — M48.061 NEUROFORAMINAL STENOSIS OF LUMBAR SPINE: ICD-10-CM

## 2019-11-25 DIAGNOSIS — M51.36 DEGENERATIVE DISC DISEASE, LUMBAR: ICD-10-CM

## 2019-11-25 DIAGNOSIS — M48.061 NEURAL FORAMINAL STENOSIS OF LUMBAR SPINE: ICD-10-CM

## 2019-11-25 PROCEDURE — 97110 THERAPEUTIC EXERCISES: CPT | Performed by: PHYSICAL THERAPIST

## 2019-11-25 PROCEDURE — 97014 ELECTRIC STIMULATION THERAPY: CPT | Performed by: PHYSICAL THERAPIST

## 2019-11-25 PROCEDURE — 97140 MANUAL THERAPY 1/> REGIONS: CPT | Performed by: PHYSICAL THERAPIST

## 2019-11-25 PROCEDURE — 97012 MECHANICAL TRACTION THERAPY: CPT | Performed by: PHYSICAL THERAPIST

## 2019-11-25 NOTE — PROGRESS NOTES
Physical Therapy Daily Progress Note    VISIT#: 4/16 in POC.     Subjective   Reyna Castano reports she is feeling that traction helps.  Is hurting today.     Pain Rating (0/10): 7    Objective     See Exercise, Manual, and Modality Logs for complete treatment.       Assessment & Plan     Assessment  Assessment details: Pt with good response to treatment.  Improved pain post traction.  Some difficulty with coordination of core contraction with movement, but improved with cues.         Plan  Progress strengthening /stabilization /functional activity.  Manual/modalities as needed.             Timed:         Manual Therapy:   13     mins  29081;     Therapeutic Exercise:    13     mins  42608;     Neuromuscular Izabella:        mins  92139;    Therapeutic Activity:          mins  37365;     Gait Training:           mins  28287;     Ultrasound:          mins  14889;    Ionto                                   mins   97339  Self Care                            mins   96128    Un-Timed:  Electrical Stimulation:    15     mins  13246 ( );  Dry Needling          mins self-pay  Traction     12     mins 01427  Low Eval          Mins  99481  Mod Eval          Mins  60423  High Eval                            Mins  91900  Re-Eval                               mins  71979    Timed Treatment:   26   mins   Total Treatment:     53   mins    Katie Colmenares, PT, DPT  Physical Therapist

## 2019-11-27 ENCOUNTER — TREATMENT (OUTPATIENT)
Dept: PHYSICAL THERAPY | Facility: CLINIC | Age: 69
End: 2019-11-27

## 2019-11-27 DIAGNOSIS — M48.061 NEUROFORAMINAL STENOSIS OF LUMBAR SPINE: ICD-10-CM

## 2019-11-27 DIAGNOSIS — J01.00 ACUTE NON-RECURRENT MAXILLARY SINUSITIS: ICD-10-CM

## 2019-11-27 DIAGNOSIS — M51.36 DEGENERATIVE DISC DISEASE, LUMBAR: ICD-10-CM

## 2019-11-27 DIAGNOSIS — M48.061 NEURAL FORAMINAL STENOSIS OF LUMBAR SPINE: ICD-10-CM

## 2019-11-27 DIAGNOSIS — M43.10 RETROLISTHESIS OF VERTEBRAE: Primary | ICD-10-CM

## 2019-11-27 PROCEDURE — 97012 MECHANICAL TRACTION THERAPY: CPT | Performed by: PHYSICAL THERAPIST

## 2019-11-27 PROCEDURE — 97140 MANUAL THERAPY 1/> REGIONS: CPT | Performed by: PHYSICAL THERAPIST

## 2019-11-27 PROCEDURE — 97014 ELECTRIC STIMULATION THERAPY: CPT | Performed by: PHYSICAL THERAPIST

## 2019-11-27 NOTE — PROGRESS NOTES
Re-Assessment / Progress Note    Patient: Reyna Castano   : 1950  Diagnosis/ICD-10 Code:  Retrolisthesis of vertebrae [M43.10]  Referring practitioner: Jude Ho MD  Date of Initial Visit: Episode Type: THERAPY  Noted: 2019    Today's Date: 2019  Patient seen for 5 sessions.      Subjective:   Reyna Castano reports: she had a good day yesterday and was feeling better.  She felt that her leg was less painful.  Today she is hurting again and rates pain at 8/10.  She is having some difficulty still with raising her RLE.     Subjective Questionnaire: Oswestry: 66% disability   Clinical Progress: improved  Home Program Compliance: Yes  Treatment has included: therapeutic exercise, manual therapy, electrical stimulation, traction and moist heat    Subjective Evaluation    Pain  Current pain ratin  At best pain ratin  At worst pain ratin          Objective      Special Questions  Patient is experiencing night pain and disturbed sleep.      Postural Observations  Seated posture: fair  Standing posture: poor  Correction of posture: makes symptoms worse     Additional Postural Observation Details  Pt has worsened pain when asked to improve posture to more upright position.      Palpation     Additional Palpation Details  Pt with increased TTP R>L lumbar paraspinals.  Pt with increased pain R sided facets at L4/5, L5/S1 with P-A and increased hypomobility present.  Pt with slight pain in interspinous region today and on L but R most involved.  Pt with tenderness present R QL, iliac crest, gluteal region, and lateral hip.  R thigh mild tenderness.  Able to now be able to flex pt's leg to 90 degrees.      Tenderness      Lumbar Spine  Tenderness in the facet joint.      Neurological Testing      Sensation      Lumbar   Left   Intact: light touch     Right   Intact: light touch     Reflexes   Left   Patellar (L4): trace (1+)  Achilles (S1): absent (0)     Right   Patellar (L4): trace  (1+)  Achilles (S1): absent (0)    Additional Neurological Details  Could not elicit reflex today.  Utilized Jendrassik maneuver to assist.  Pt reports M.D. Was able to get reflexes in office at ankles. - at eval     Active Range of Motion     Additional Active Range of Motion Details  Severe limitations grossly with extension, mod with rotation, and mod with side bending.  Moderate restrictions with flexion.  Pain all directions, but extension most painful for pt.  Cannot  full upright position.       Strength/Myotome Testing      Left Hip   Planes of Motion   Flexion: 3  Extension: 3-  Abduction: 3-  Adduction: 3-  External rotation: 3-  Internal rotation: 3-     Right Hip   Planes of Motion   Flexion: 3-  Extension: 3-  Abduction: 3-  Adduction: 3-  External rotation: 3-  Internal rotation: 3-     Left Knee   Flexion: 4  Extension: 4+     Right Knee   Flexion: 4-  Extension: 4     Left Ankle/Foot   Dorsiflexion: 5  Eversion: 5     Right Ankle/Foot   Dorsiflexion: 4+  Eversion: 4+     Tests      Lumbar   Positive repeated extension.      Left   Negative crossed SLR and passive SLR.      Right   Positive quadrant.   Negative crossed SLR and passive SLR.      Ambulation      Comments   Pt ambulates with increased lateral displacement and slow gait speed.  Uses SPC and has difficulty placing full weight through RLE.     Functional Assessment      Comments  Bed mobility: mod I,but slow in rolling and supine to/from sit.  Has to assist with use of UE to help RLE on/off plinth.  Improved with less assist required LLE.   Transfers: mod I, requires use of BUE to push up and slow in transition - improving slightly.        Assessment & Plan     Assessment  Assessment details: Pt at this time is continuing to have high pain levels with standing or ambulation.  Some slight improvement in pain level at times with treatment, and showing slow progress.  Still requires assist of BUE to raise RLE, but can raise LLE with  less assist.  Has only attended x5 sessions and with her condition more skilled PT is necessary.  She gets relief with traction, and has over the last couple visits been able to add in basic core control/coordination activities.       Progress toward previous goals: Partially Met    Goals  SHORT TERM GOALS: Time for Goal Achievement: 4 weeks    1.  Patient to be compliant and progression of HEP. - met                   2.  Pain level < 7/10 at worst with mentioned activities to improve function and ability to sit to perform job. - not met but progressed, 8/10 current but was 10/10 at eval  3.  Increased lumbar and SIJ mobility to allow for increased lumbar AROM with less pain. - progressing  4.  Increased lumbar AROM to by 25% in all planes to allow for increased ease with sit-stand transfers and functional activities. - progressing    LONG TERM GOALS: Time for Goal Achievement: 2 months  1.  Pt. to score < 40% on Back Index. - not met but improved from eval slightly.   2.  Pain level < 4/10 with all listed activities to return to higher level of function. - not met  3.  Lumbar AROM to WFL to allow for return to household, work, & recreational activities w/o increased symptoms. - not met  4.  (B) LE and lower abdominal strength to 4+ to 5/5 to allow for pushing, pulling and activities to occur without pain (driving, sitting, household  & job requirements). - not met  5. Pt to be able to ambulate without use of an AD for household and community distances with proper gait mechanics and min to no reports of R thigh pain. - not met        Plan  Therapy options: will be seen for skilled physical therapy services  Planned modality interventions: cryotherapy, electrical stimulation/Russian stimulation, TENS, thermotherapy (hydrocollator packs) and traction  Planned therapy interventions: abdominal trunk stabilization, balance/weight-bearing training, flexibility, functional ROM exercises, gait training, home exercise  program, joint mobilization, manual therapy, neuromuscular re-education, soft tissue mobilization, spinal/joint mobilization, strengthening, stretching and therapeutic activities  Frequency: 2x week  Duration in visits: 11 visits left in current POC  Treatment plan discussed with: patient  Plan details: Continue current and progress core/LE strength as able.   Prognosis to achieve goals: good    PT Signature: Katie Colmenares, PT, DPT    Based upon review of the patient's progress and continued therapy plan, it is my medical opinion that Reyna Castano should continue physical therapy treatment at [facility]  [address]  [place] [address] [phone].    Signature: __________________________________  Jude Ho MD    Timed:         Manual Therapy:   15     mins  46206;     Therapeutic Exercise:         mins  87737;     Neuromuscular Izabella:        mins  18612;    Therapeutic Activity:          mins  58775;     Gait Training:           mins  26885;     Ultrasound:          mins  05448;    Ionto                                   mins   18795  Self Care                            mins   16193        Un-Timed:  Electrical Stimulation:    15     mins  93679 ( );  Dry Needling          mins self-pay  Traction     12     mins 62632  Low Eval          Mins  17653  Mod Eval          Mins  27759  High Eval                            Mins  43170  Re-Eval                               mins  15241        Timed Treatment:   15   mins   Total Treatment:     42   mins

## 2019-12-02 ENCOUNTER — TREATMENT (OUTPATIENT)
Dept: PHYSICAL THERAPY | Facility: CLINIC | Age: 69
End: 2019-12-02

## 2019-12-02 DIAGNOSIS — M51.36 DEGENERATIVE DISC DISEASE, LUMBAR: ICD-10-CM

## 2019-12-02 DIAGNOSIS — M48.061 NEUROFORAMINAL STENOSIS OF LUMBAR SPINE: ICD-10-CM

## 2019-12-02 DIAGNOSIS — J01.00 ACUTE NON-RECURRENT MAXILLARY SINUSITIS: ICD-10-CM

## 2019-12-02 DIAGNOSIS — M48.061 NEURAL FORAMINAL STENOSIS OF LUMBAR SPINE: ICD-10-CM

## 2019-12-02 DIAGNOSIS — M43.10 RETROLISTHESIS OF VERTEBRAE: Primary | ICD-10-CM

## 2019-12-02 PROCEDURE — 97140 MANUAL THERAPY 1/> REGIONS: CPT | Performed by: PHYSICAL THERAPIST

## 2019-12-02 PROCEDURE — 97012 MECHANICAL TRACTION THERAPY: CPT | Performed by: PHYSICAL THERAPIST

## 2019-12-02 PROCEDURE — 97014 ELECTRIC STIMULATION THERAPY: CPT | Performed by: PHYSICAL THERAPIST

## 2019-12-02 NOTE — PROGRESS NOTES
Physical Therapy Daily Progress Note    VISIT#: 6/16    Subjective   Reyna Castano reports she felt weak after last visit due to the exercises.  She reports hurting bad today also and her R knee is bothering her.  Pain is into her R thigh today.     Pain Rating (0/10): 8    Objective     See Exercise, Manual, and Modality Logs for complete treatment.     Assessment & Plan     Assessment  Assessment details: Held on therex today per pt request.  E-stim, manual and traction only.  Less LE pain and improved gait mechanics/weight bearing RLE post treatment.  Not able to progress today 2' pain level.         Plan  Progress strengthening /stabilization /functional activity.  Traction/manual/modalities as needed.             Timed:         Manual Therapy:    12     mins  32835;     Therapeutic Exercise:         mins  18232;     Neuromuscular Izabella:        mins  52765;    Therapeutic Activity:          mins  20343;     Gait Training:           mins  99097;     Ultrasound:          mins  38106;    Ionto                                   mins   87187  Self Care                            mins   57547    Un-Timed:  Electrical Stimulation:    15     mins  74701 ( );  Dry Needling          mins self-pay  Traction     12     mins 70913  Low Eval          Mins  10426  Mod Eval          Mins  40505  High Eval                            Mins  34505  Re-Eval                               mins  38548    Timed Treatment:  12    mins   Total Treatment:     39   mins    Katie Colmenares, PT, DPT  Physical Therapist

## 2019-12-03 RX ORDER — HYDROCODONE BITARTRATE AND ACETAMINOPHEN 5; 325 MG/1; MG/1
1 TABLET ORAL EVERY 6 HOURS PRN
Qty: 28 TABLET | Refills: 0 | Status: SHIPPED | OUTPATIENT
Start: 2019-12-03 | End: 2021-09-03

## 2019-12-03 RX ORDER — AMITRIPTYLINE HYDROCHLORIDE 25 MG/1
TABLET, FILM COATED ORAL
Qty: 30 TABLET | Refills: 0 | Status: SHIPPED | OUTPATIENT
Start: 2019-12-03 | End: 2020-01-06

## 2019-12-03 RX ORDER — GABAPENTIN 300 MG/1
CAPSULE ORAL
Qty: 60 CAPSULE | Refills: 0 | Status: SHIPPED | OUTPATIENT
Start: 2019-12-03 | End: 2021-09-03

## 2019-12-04 ENCOUNTER — TREATMENT (OUTPATIENT)
Dept: PHYSICAL THERAPY | Facility: CLINIC | Age: 69
End: 2019-12-04

## 2019-12-04 DIAGNOSIS — M48.061 NEUROFORAMINAL STENOSIS OF LUMBAR SPINE: ICD-10-CM

## 2019-12-04 DIAGNOSIS — M43.10 RETROLISTHESIS OF VERTEBRAE: Primary | ICD-10-CM

## 2019-12-04 DIAGNOSIS — M48.061 NEURAL FORAMINAL STENOSIS OF LUMBAR SPINE: ICD-10-CM

## 2019-12-04 DIAGNOSIS — M51.36 DEGENERATIVE DISC DISEASE, LUMBAR: ICD-10-CM

## 2019-12-04 DIAGNOSIS — J01.00 ACUTE NON-RECURRENT MAXILLARY SINUSITIS: ICD-10-CM

## 2019-12-04 PROCEDURE — 97140 MANUAL THERAPY 1/> REGIONS: CPT | Performed by: PHYSICAL THERAPIST

## 2019-12-04 PROCEDURE — 97110 THERAPEUTIC EXERCISES: CPT | Performed by: PHYSICAL THERAPIST

## 2019-12-04 NOTE — PROGRESS NOTES
Physical Therapy Daily Progress Note    VISIT#: 7/16 in POC.     Subjective   Reyna Castano reports she thinks we are doing something wrong as she is having worsened low back pain.  She had difficulty getting up into her husbands vehicle pushing with her stronger leg.  Feels pain across whole low back.     Pain Rating (0/10): 8    Objective     See Exercise, Manual, and Modality Logs for complete treatment.       Assessment & Plan     Assessment  Assessment details: Pt with improved standing tolerance end of session. Less pain when ambulating.  Held traction today and may consider holding next visit also.         Plan  Progress strengthening /stabilization /functional activity. Traction vs. No traction?             Timed:         Manual Therapy:   23      mins  87132;     Therapeutic Exercise:    17     mins  42817;     Neuromuscular Izabella:        mins  69025;    Therapeutic Activity:          mins  96600;     Gait Training:           mins  70801;     Ultrasound:          mins  09030;    Ionto                                   mins   74304  Self Care                            mins   54787    Un-Timed:  Electrical Stimulation:         mins  13771 ( );  Dry Needling          mins self-pay  Traction          mins 42044  Low Eval          Mins  68843  Mod Eval          Mins  23032  High Eval                            Mins  25023  Re-Eval                               mins  34120    Timed Treatment:   40   mins   Total Treatment:     40   mins    Katie Colmenares, PT, DPT  Physical Therapist

## 2019-12-11 ENCOUNTER — TREATMENT (OUTPATIENT)
Dept: PHYSICAL THERAPY | Facility: CLINIC | Age: 69
End: 2019-12-11

## 2019-12-11 DIAGNOSIS — M43.10 RETROLISTHESIS OF VERTEBRAE: Primary | ICD-10-CM

## 2019-12-11 DIAGNOSIS — M51.36 DEGENERATIVE DISC DISEASE, LUMBAR: ICD-10-CM

## 2019-12-11 DIAGNOSIS — J01.00 ACUTE NON-RECURRENT MAXILLARY SINUSITIS: ICD-10-CM

## 2019-12-11 DIAGNOSIS — M48.061 NEURAL FORAMINAL STENOSIS OF LUMBAR SPINE: ICD-10-CM

## 2019-12-11 DIAGNOSIS — M48.061 NEUROFORAMINAL STENOSIS OF LUMBAR SPINE: ICD-10-CM

## 2019-12-11 PROCEDURE — 97110 THERAPEUTIC EXERCISES: CPT | Performed by: PHYSICAL THERAPIST

## 2019-12-11 NOTE — PROGRESS NOTES
Physical Therapy Daily Progress Note    VISIT#: 8/16 in POC.     Subjective   Reyna Castano reports she felt that the exercises performed last visit were helpful.  She would like to do more exercises today as she was not sure if the traction was helping her.  She is still having some difficulty with raising her leg up and getting it on/off plinth/bed.    Pain Rating (0/10): 8    Objective     See Exercise, Manual, and Modality Logs for complete treatment.     Patient Education: Instructed pt in HEP and gave her handout.  Gave her 2 therabands (blue and red) to use at home with exercises.      Assessment & Plan     Assessment  Assessment details: Pt with ability to be progressed in standing today; however, still very limited in activities.  Could not perform full range heel raise in standing without bending knees or shifting weight to her BUE.  Performed in sitting instead and pt could feel fatigue.  Still cannot march legs up/down due to weakness and pain in hooklying, but in sitting is better.  Still also assists with her UE to raise/move her legs.  She will continue to benefit from skilled PT to progress her strength, improve her pain, and improve her endurance/activity tolerance.         Plan  Progress strengthening /stabilization /functional activity            Timed:         Manual Therapy:        mins  86352;     Therapeutic Exercise:    60     mins  27353;     Neuromuscular Izabella:        mins  38288;    Therapeutic Activity:          mins  99782;     Gait Training:           mins  77866;     Ultrasound:          mins  06399;    Ionto                                   mins   68598  Self Care                            mins   40169    Un-Timed:  Electrical Stimulation:         mins  91484 ( );  Dry Needling          mins self-pay  Traction          mins 58243  Low Eval          Mins  24818  Mod Eval          Mins  79244  High Eval                            Mins  62459  Re-Eval                                mins  37700    Timed Treatment:   60   mins   Total Treatment:     60   mins    Katie Colmenares, PT, DPT  Physical Therapist

## 2019-12-16 ENCOUNTER — TREATMENT (OUTPATIENT)
Dept: PHYSICAL THERAPY | Facility: CLINIC | Age: 69
End: 2019-12-16

## 2019-12-16 DIAGNOSIS — J01.00 ACUTE NON-RECURRENT MAXILLARY SINUSITIS: ICD-10-CM

## 2019-12-16 DIAGNOSIS — M48.061 NEURAL FORAMINAL STENOSIS OF LUMBAR SPINE: ICD-10-CM

## 2019-12-16 DIAGNOSIS — M48.061 NEUROFORAMINAL STENOSIS OF LUMBAR SPINE: ICD-10-CM

## 2019-12-16 DIAGNOSIS — M43.10 RETROLISTHESIS OF VERTEBRAE: Primary | ICD-10-CM

## 2019-12-16 DIAGNOSIS — M51.36 DEGENERATIVE DISC DISEASE, LUMBAR: ICD-10-CM

## 2019-12-16 PROCEDURE — 97110 THERAPEUTIC EXERCISES: CPT | Performed by: PHYSICAL THERAPIST

## 2019-12-17 NOTE — PROGRESS NOTES
Physical Therapy Daily Progress Note    VISIT#: 9/16 in POC.     Subjective   Reyna Castano reports she is not doing as well today.  Felt better after last visit and then felt good on Saturday.  Starting hurting again on Sunday.     Pain Rating (0/10): 6    Objective     See Exercise, Manual, and Modality Logs for complete treatment.     Assessment & Plan     Assessment  Assessment details: Pt with good response to treatment today with slight less discomfort.  Has some issues with prolonged standing activities today but demonstrated improved control.        Plan  Progress strengthening /stabilization /functional activity.            Timed:         Manual Therapy:         mins  61917;     Therapeutic Exercise:    30     mins  23760;     Neuromuscular Izabella:        mins  89164;    Therapeutic Activity:          mins  65361;     Gait Training:           mins  08674;     Ultrasound:          mins  86982;    Ionto                                   mins   67297  Self Care                            mins   21648    Un-Timed:  Electrical Stimulation:         mins  54076 ( );  Dry Needling          mins self-pay  Traction          mins 88759  Low Eval          Mins  96173  Mod Eval          Mins  52638  High Eval                            Mins  53945  Re-Eval                               mins  56567    Timed Treatment:   30   mins   Total Treatment:     30   mins    Katie Colmenares, PT, DPT  Physical Therapist

## 2019-12-23 ENCOUNTER — TREATMENT (OUTPATIENT)
Dept: PHYSICAL THERAPY | Facility: CLINIC | Age: 69
End: 2019-12-23

## 2019-12-23 DIAGNOSIS — J01.00 ACUTE NON-RECURRENT MAXILLARY SINUSITIS: ICD-10-CM

## 2019-12-23 DIAGNOSIS — M48.061 NEURAL FORAMINAL STENOSIS OF LUMBAR SPINE: ICD-10-CM

## 2019-12-23 DIAGNOSIS — M48.061 NEUROFORAMINAL STENOSIS OF LUMBAR SPINE: ICD-10-CM

## 2019-12-23 DIAGNOSIS — M43.10 RETROLISTHESIS OF VERTEBRAE: Primary | ICD-10-CM

## 2019-12-23 DIAGNOSIS — M51.36 DEGENERATIVE DISC DISEASE, LUMBAR: ICD-10-CM

## 2019-12-23 PROCEDURE — 97110 THERAPEUTIC EXERCISES: CPT | Performed by: PHYSICAL THERAPIST

## 2019-12-23 NOTE — PROGRESS NOTES
Physical Therapy Daily Progress Note    VISIT#: 10/16 [facility].     Subjective   Reyna Castano reports she feels good when she leaves PT, and then the next day she is sore.  The following day she is usually feeling some better.  She is still having ups and downs where some days are ok, and then other days are not.  She is hoping to possibly return to work in January.     Pain Rating (0/10): 4    Objective     See Exercise, Manual, and Modality Logs for complete treatment.         Assessment & Plan     Assessment  Assessment details: No pain during treatment.  Cues required for proper hip abd and squat performance.         Plan  Progress strengthening /stabilization /functional activity            Timed:         Manual Therapy:         mins  54391;     Therapeutic Exercise:    30     mins  47941;     Neuromuscular Izabella:        mins  53194;    Therapeutic Activity:          mins  09625;     Gait Training:           mins  85698;     Ultrasound:          mins  99561;    Ionto                                   mins   87524  Self Care                            mins   93404    Un-Timed:  Electrical Stimulation:         mins  54339 (MC );  Dry Needling          mins self-pay  Traction          mins 61798  Low Eval          Mins  43897  Mod Eval          Mins  80210  High Eval                            Mins  04173  Re-Eval                               mins  34694    Timed Treatment:   30   mins   Total Treatment:     30   mins    Katie Colmenares, PT, DPT  Physical Therapist

## 2019-12-27 ENCOUNTER — TREATMENT (OUTPATIENT)
Dept: PHYSICAL THERAPY | Facility: CLINIC | Age: 69
End: 2019-12-27

## 2019-12-27 DIAGNOSIS — M51.36 DEGENERATIVE DISC DISEASE, LUMBAR: ICD-10-CM

## 2019-12-27 DIAGNOSIS — M48.061 NEURAL FORAMINAL STENOSIS OF LUMBAR SPINE: ICD-10-CM

## 2019-12-27 DIAGNOSIS — M48.061 NEUROFORAMINAL STENOSIS OF LUMBAR SPINE: Primary | ICD-10-CM

## 2019-12-27 DIAGNOSIS — M43.10 RETROLISTHESIS OF VERTEBRAE: ICD-10-CM

## 2019-12-27 DIAGNOSIS — J01.00 ACUTE NON-RECURRENT MAXILLARY SINUSITIS: ICD-10-CM

## 2019-12-27 PROCEDURE — 97110 THERAPEUTIC EXERCISES: CPT | Performed by: PHYSICAL THERAPIST

## 2019-12-27 PROCEDURE — 97530 THERAPEUTIC ACTIVITIES: CPT | Performed by: PHYSICAL THERAPIST

## 2019-12-27 NOTE — PROGRESS NOTES
Physical Therapy Daily Progress Note    VISIT#: 11/16 in POC.     Subjective   Reyna Castano reports she is having a better day today.  Her back is hurting her less.  She has been frustrated because she feels that she is getting threatening letters from [facility].  She got  A letter stating she was going to have to return to work on 12/31 if she did not turn in certain paperwork.  She called them as she has sent in all paperwork that has been asked for.  She is very frustrated with the process as every week updates seems ridiculous.  She reports that she feels PT is benefiting her overall with her back, strength, and her knee.  She returns to Dr. Ho on 1/6.  She hopes to be cleared to return to work at that time. She would like to continue PT even after return to work.       Pain Rating (0/10): 7    Objective     See Exercise, Manual, and Modality Logs for complete treatment.     Assessment & Plan     Assessment  Assessment details: Pt with slight improvement in her hip flexion ability today.  Stability in standing and activity tolerance also slightly improving.  Still has significant ROM deficits and limitations in her performance of therex due to pain and strength/balance issues.  Not yet able to wean from cane.          Plan  Progress per Plan of Care.  Reassess over next 1-2 visits prior to her next M.D. Visit.             Timed:         Manual Therapy:         mins  99475;     Therapeutic Exercise:    23     mins  99411;     Neuromuscular Izabella:        mins  68273;    Therapeutic Activity:     8     mins  99909;     Gait Training:           mins  87597;     Ultrasound:          mins  95255;    Ionto                                   mins   98597  Self Care                            mins   07493    Un-Timed:  Electrical Stimulation:         mins  21770 ( );  Dry Needling          mins self-pay  Traction          mins 14413  Low Eval          Mins  54073  Mod Eval          Mins  91638  High Eval                             Mins  16317  Re-Eval                               mins  50704    Timed Treatment:   31   mins   Total Treatment:     31   mins    Katie Colmenares, PT, DPT  Physical Therapist

## 2019-12-30 ENCOUNTER — TREATMENT (OUTPATIENT)
Dept: PHYSICAL THERAPY | Facility: CLINIC | Age: 69
End: 2019-12-30

## 2019-12-30 DIAGNOSIS — M48.061 NEURAL FORAMINAL STENOSIS OF LUMBAR SPINE: ICD-10-CM

## 2019-12-30 DIAGNOSIS — M48.061 NEUROFORAMINAL STENOSIS OF LUMBAR SPINE: Primary | ICD-10-CM

## 2019-12-30 DIAGNOSIS — J01.00 ACUTE NON-RECURRENT MAXILLARY SINUSITIS: ICD-10-CM

## 2019-12-30 DIAGNOSIS — M43.10 RETROLISTHESIS OF VERTEBRAE: ICD-10-CM

## 2019-12-30 DIAGNOSIS — M51.36 DEGENERATIVE DISC DISEASE, LUMBAR: ICD-10-CM

## 2019-12-30 PROCEDURE — 97110 THERAPEUTIC EXERCISES: CPT | Performed by: PHYSICAL THERAPIST

## 2019-12-30 NOTE — PROGRESS NOTES
Physical Therapy Daily Progress Note    VISIT#: 12/16 in POC.     Subjective   Reyna Castano reports she is doing some better today.  Reports still has ups/downs, but feels less pain in her back.  R knee is giving her issues.     Pain Rating (0/10): 6    Objective     See Exercise, Manual, and Modality Logs for complete treatment.     Assessment & Plan     Assessment  Assessment details: Fatigued end of session with slight increase in R knee pain.  Added in tandem dynamic balance and pt required use of SPC and 1 HHA.         Plan  Progress strengthening /stabilization /functional activity.             Timed:         Manual Therapy:         mins  51691;     Therapeutic Exercise:    35     mins  55098;     Neuromuscular Izabella:        mins  99609;    Therapeutic Activity:          mins  74472;     Gait Training:           mins  09727;     Ultrasound:          mins  68068;    Ionto                                   mins   69139  Self Care                            mins   35044    Un-Timed:  Electrical Stimulation:         mins  95550 ( );  Dry Needling          mins self-pay  Traction          mins 99968  Low Eval          Mins  51180  Mod Eval          Mins  23430  High Eval                            Mins  80323  Re-Eval                               mins  86070    Timed Treatment:   35   mins   Total Treatment:     35   mins    Katie Colmenares, PT, DPT  Physical Therapist

## 2020-01-02 ENCOUNTER — TREATMENT (OUTPATIENT)
Dept: PHYSICAL THERAPY | Facility: CLINIC | Age: 70
End: 2020-01-02

## 2020-01-02 DIAGNOSIS — M48.061 NEUROFORAMINAL STENOSIS OF LUMBAR SPINE: Primary | ICD-10-CM

## 2020-01-02 DIAGNOSIS — M48.061 NEURAL FORAMINAL STENOSIS OF LUMBAR SPINE: ICD-10-CM

## 2020-01-02 DIAGNOSIS — M51.36 DEGENERATIVE DISC DISEASE, LUMBAR: ICD-10-CM

## 2020-01-02 DIAGNOSIS — J01.00 ACUTE NON-RECURRENT MAXILLARY SINUSITIS: ICD-10-CM

## 2020-01-02 DIAGNOSIS — M43.10 RETROLISTHESIS OF VERTEBRAE: ICD-10-CM

## 2020-01-02 PROCEDURE — 97110 THERAPEUTIC EXERCISES: CPT | Performed by: PHYSICAL THERAPIST

## 2020-01-02 PROCEDURE — 97530 THERAPEUTIC ACTIVITIES: CPT | Performed by: PHYSICAL THERAPIST

## 2020-01-02 NOTE — PROGRESS NOTES
Re-Assessment / Progress Note    Patient: Reyna Castano   : 1950  Diagnosis/ICD-10 Code:  Neuroforaminal stenosis of lumbar spine [M99.83]  Referring practitioner: Jude Ho MD  Date of Initial Visit: Episode Type: THERAPY  Noted: 2019    Today's Date: 2020  Patient seen for 13 sessions.      Subjective:   Reyna Castano reports: she is doing some better.  Reports having continued R knee issues and LBP.  Is able to sit better for longer periods of time.  Can stand up to 20-30 min but then will need to sit down due to fatigue an pain in her low back and R knee.  Reports still pain present across low back but pain at best has improved.     Subjective Questionnaire: Oswestry: 46% disability   Clinical Progress: improved  Home Program Compliance: Yes  Treatment has included: therapeutic exercise, neuromuscular re-education, manual therapy, therapeutic activity, electrical stimulation and moist heat    Subjective Evaluation    Pain  Current pain ratin  At best pain ratin  At worst pain ratin  Location: low back and R knee          Objective      Postural Observations  Seated posture: fair  Standing posture: fair  Correction of posture: makes symptoms worse     Additional Postural Observation Details  Pt has worsened pain when asked to improve posture to more upright position.      Tenderness      Lumbar Spine  Tenderness in the facet joint.      Neurological Testing      Sensation      Lumbar   Left   Intact: light touch     Right   Intact: light touch     Reflexes   Left   Patellar (L4): trace (1+)  Achilles (S1): trace (1+)     Right   Patellar (L4): trace (1+)  Achilles (S1): trace (1+)     Active Range of Motion     Additional Active Range of Motion Details  Mod limitations grossly with extension, mod with rotation, and mod with side bending.  Min/mod restrictions with flexion.  Pain all directions, but extension most painful for pt.  Cannot  full upright position.  ROM  generally improved slightly since beginning PT.      Strength/Myotome Testing      Left Hip   Planes of Motion   Flexion: 4-  Extension: 3-  Abduction: 3+  Adduction: 3-  External rotation: 3+  Internal rotation: 3+     Right Hip   Planes of Motion   Flexion: 3-  Extension: 3-  Abduction: 3+  Adduction: 3-  External rotation: 3-  Internal rotation: 3-     Left Knee   Flexion: 4  Extension: 4+     Right Knee   Flexion: 4  Extension: 4     Left Ankle/Foot   Dorsiflexion: 5  Eversion: 5     Right Ankle/Foot   Dorsiflexion: 4+  Eversion: 4+    Assessment & Plan     Assessment  Assessment details: Slight improvement in ROM compared to last reassess.  Was able to elicit achilles reflex today also BLE.  Continued movement restrictions 2' pain, but R knee most limiting to progress.  Low back pain over last 1-2 weeks on average lower at 5-6/10, but at times, activity dependent, can still reach up to an 8/10. Progressing with more standing and some stability/balance therex also, but have to slowly progress 2' R knee.       Progress toward previous goals: Partially Met    Goals  SHORT TERM GOALS: Time for Goal Achievement: 4 weeks    1.  Patient to be compliant and progression of HEP. - met                   2.  Pain level < 7/10 at worst with mentioned activities to improve function and ability to sit to perform job. - met  3.  Increased lumbar and SIJ mobility to allow for increased lumbar AROM with less pain. - progressing  4.  Increased lumbar AROM to by 25% in all planes to allow for increased ease with sit-stand transfers and functional activities. - met    LONG TERM GOALS: Time for Goal Achievement: 2 months  1.  Pt. to score < 40% on Back Index. - not met but improved (46%).   2.  Pain level < 4/10 with all listed activities to return to higher level of function. - not met  3.  Lumbar AROM to WFL to allow for return to household, work, & recreational activities w/o increased symptoms. - not met  4.  (B) LE and lower  abdominal strength to 4+ to 5/5 to allow for pushing, pulling and activities to occur without pain (driving, sitting, household  & job requirements). - not met  5. Pt to be able to ambulate without use of an AD for household and community distances with proper gait mechanics and min to no reports of R thigh pain. - not met      Plan  Therapy options: will be seen for skilled physical therapy services  Planned modality interventions: cryotherapy, electrical stimulation/Russian stimulation, TENS, thermotherapy (hydrocollator packs) and traction  Planned therapy interventions: abdominal trunk stabilization, balance/weight-bearing training, flexibility, functional ROM exercises, gait training, home exercise program, joint mobilization, manual therapy, neuromuscular re-education, soft tissue mobilization, spinal/joint mobilization, strengthening, stretching and therapeutic activities  Frequency: 2x week  Duration in visits: 12  Treatment plan discussed with: patient  Plan details: Continue current and progress core/LE strength as able.   Prognosis to achieve goals: good      PT Signature: Katie Colmenares, PT, DPT      Based upon review of the patient's progress and continued therapy plan, it is my medical opinion that Reyna Castano should continue physical therapy treatment at [facility]  [address]  [place] [address] [phone].    Signature: __________________________________  Jude Ho MD    Timed:         Manual Therapy:         mins  67712;     Therapeutic Exercise:    23     mins  37060;     Neuromuscular Izabella:        mins  00151;    Therapeutic Activity:   8       mins  03120;     Gait Training:           mins  99247;     Ultrasound:          mins  22079;    Ionto                                   mins   36288  Self Care                            mins   99726        Un-Timed:  Electrical Stimulation:         mins  29926 ( );  Dry  Needling          mins self-pay  Traction          mins 12597  Low Eval          Mins  21523  Mod Eval          Mins  12493  High Eval                            Mins  81097  Re-Eval                               mins  89478        Timed Treatment:    31  mins   Total Treatment:    31   mins

## 2020-01-06 ENCOUNTER — OFFICE VISIT (OUTPATIENT)
Dept: NEUROSURGERY | Facility: CLINIC | Age: 70
End: 2020-01-06

## 2020-01-06 VITALS
DIASTOLIC BLOOD PRESSURE: 97 MMHG | WEIGHT: 229 LBS | HEART RATE: 84 BPM | BODY MASS INDEX: 35.94 KG/M2 | SYSTOLIC BLOOD PRESSURE: 177 MMHG | HEIGHT: 67 IN

## 2020-01-06 DIAGNOSIS — M51.36 DEGENERATIVE DISC DISEASE, LUMBAR: Primary | ICD-10-CM

## 2020-01-06 PROBLEM — D62 ANEMIA DUE TO ACUTE BLOOD LOSS: Status: ACTIVE | Noted: 2020-01-06

## 2020-01-06 PROBLEM — J30.1 HAY FEVER: Status: ACTIVE | Noted: 2020-01-06

## 2020-01-06 PROBLEM — M51.369 DEGENERATIVE DISC DISEASE, LUMBAR: Status: ACTIVE | Noted: 2020-01-06

## 2020-01-06 PROBLEM — Z23 ENCOUNTER FOR IMMUNIZATION: Status: ACTIVE | Noted: 2018-08-20

## 2020-01-06 PROCEDURE — 99212 OFFICE O/P EST SF 10 MIN: CPT | Performed by: PHYSICIAN ASSISTANT

## 2020-01-06 NOTE — PROGRESS NOTES
"Subjective   Reyna Castano is a 69 y.o. female.     Chief Complaint   Patient presents with   • Back Pain     2 Month Follow UP     Visit Vitals  /97   Pulse 84   Ht 170.2 cm (67\")   Wt 104 kg (229 lb)   BMI 35.87 kg/m²       History of Present Illness:  Ms. Reyna Castano is a 69-year-old female presents the office today as a follow-up for lower back pain.  Her last visit we referred the patient to physical therapy, pain management for facet block and started her on 3 different medicines to include gabapentin, Elavil, meloxicam.  Patient reports significant relief with physical therapy and is ready to return to work full-time.    The following portions of the patient's history were reviewed and updated as appropriate: allergies, current medications, past family history, past medical history, past social history, past surgical history and problem list.    Review of Systems      Past Surgical History:   Procedure Laterality Date   • BACK SURGERY     • COLONOSCOPY     • HYSTERECTOMY         Past Medical History:   Diagnosis Date   • Diabetes mellitus (CMS/[facility])    • Hypertension    • Injury of back        Objective   Physical Exam  Neurologic Exam  Ortho Exam    Alert and oriented  No acute distress  No loss of range of motion  Chronic edema in the bilateral lower extremities  No motor weakness  No sensory deficits  No gait ataxia      Assessment and Plan:   Discussed this patient with Dr. Ho.  The patient is ready return to work full-time and provided with a return to work note from the office today and instruct her to follow-up in the office on an as-needed basis.  She did request an additional 8 weeks of physical therapy since this is helped her significantly with her pain.  We did refer her back to physical therapy.    Follow-up as needed      Problems Addressed this Visit     None               "

## 2020-01-13 ENCOUNTER — TREATMENT (OUTPATIENT)
Dept: PHYSICAL THERAPY | Facility: CLINIC | Age: 70
End: 2020-01-13

## 2020-01-13 DIAGNOSIS — M51.36 DEGENERATIVE DISC DISEASE, LUMBAR: ICD-10-CM

## 2020-01-13 DIAGNOSIS — M43.10 RETROLISTHESIS OF VERTEBRAE: ICD-10-CM

## 2020-01-13 DIAGNOSIS — M48.061 NEURAL FORAMINAL STENOSIS OF LUMBAR SPINE: ICD-10-CM

## 2020-01-13 DIAGNOSIS — J01.00 ACUTE NON-RECURRENT MAXILLARY SINUSITIS: ICD-10-CM

## 2020-01-13 DIAGNOSIS — M48.061 NEUROFORAMINAL STENOSIS OF LUMBAR SPINE: Primary | ICD-10-CM

## 2020-01-13 PROCEDURE — 97110 THERAPEUTIC EXERCISES: CPT | Performed by: PHYSICAL THERAPIST

## 2020-01-14 RX ORDER — ATORVASTATIN CALCIUM 10 MG/1
TABLET, FILM COATED ORAL
Qty: 90 TABLET | Refills: 3 | Status: SHIPPED | OUTPATIENT
Start: 2020-01-14 | End: 2020-11-09

## 2020-01-14 RX ORDER — LINAGLIPTIN 5 MG/1
TABLET, FILM COATED ORAL
Qty: 90 TABLET | Refills: 3 | Status: SHIPPED | OUTPATIENT
Start: 2020-01-14 | End: 2020-11-09

## 2020-01-14 NOTE — PROGRESS NOTES
Physical Therapy Daily Progress Note    VISIT#: 14    Subjective   Reyna Castano reports she has been more sore throughout the week due to sitting more. She has returned to working at home.  She has missed coming to PT.     Pain Rating (0/10): 6    Objective     See Exercise, Manual, and Modality Logs for complete treatment.     Patient Education:  Discussed with pt taking more standing rest/stretch breaks once an hour to help with sitting time during work.      Assessment & Plan     Assessment  Assessment details: Able to continue with standing and sitting therex.  Still requires slow progression due to comorbidities.         Plan  Progress strengthening /stabilization /functional activity.            Timed:         Manual Therapy:         mins  42004;     Therapeutic Exercise:    40     mins  33247;     Neuromuscular Izabella:       mins  61700;    Therapeutic Activity:          mins  29231;     Gait Training:           mins  42639;     Ultrasound:          mins  70289;    Ionto                                   mins   35640  Self Care                            mins   53195    Un-Timed:  Electrical Stimulation:         mins  50279 ( );  Dry Needling          mins self-pay  Traction          mins 98339  Low Eval          Mins  37623  Mod Eval          Mins  82318  High Eval                            Mins  61562  Re-Eval                               mins  37777    Timed Treatment:   40   mins   Total Treatment:     40   mins    Katie Colmenares, PT, DPT  Physical Therapist

## 2020-01-21 ENCOUNTER — TREATMENT (OUTPATIENT)
Dept: PHYSICAL THERAPY | Facility: CLINIC | Age: 70
End: 2020-01-21

## 2020-01-21 DIAGNOSIS — M48.061 NEUROFORAMINAL STENOSIS OF LUMBAR SPINE: Primary | ICD-10-CM

## 2020-01-21 DIAGNOSIS — M48.061 NEURAL FORAMINAL STENOSIS OF LUMBAR SPINE: ICD-10-CM

## 2020-01-21 DIAGNOSIS — J01.00 ACUTE NON-RECURRENT MAXILLARY SINUSITIS: ICD-10-CM

## 2020-01-21 DIAGNOSIS — M51.36 DEGENERATIVE DISC DISEASE, LUMBAR: ICD-10-CM

## 2020-01-21 DIAGNOSIS — M43.10 RETROLISTHESIS OF VERTEBRAE: ICD-10-CM

## 2020-01-21 PROCEDURE — 97110 THERAPEUTIC EXERCISES: CPT | Performed by: PHYSICAL THERAPIST

## 2020-01-22 NOTE — PROGRESS NOTES
Physical Therapy Daily Progress Note    VISIT#: 15    Subjective   Reyna Castano reports she notices that she feels worse when she is not in PT.  She has been able to walk some without her cane in her home a few steps.     Pain Rating (0/10): 5    Objective     See Exercise, Manual, and Modality Logs for complete treatment.     Assessment & Plan     Assessment  Assessment details: Pt still required slow progression.  Slight improvement in squatting ability today and ability to be progressed to leg press.         Plan  Progress strengthening /stabilization /functional activity            Timed:         Manual Therapy:         mins  76539;     Therapeutic Exercise:    30     mins  38334;     Neuromuscular Izabella:        mins  96408;    Therapeutic Activity:          mins  03388;     Gait Training:           mins  38969;     Ultrasound:          mins  60588;    Ionto                                   mins   64718  Self Care                            mins   10286    Un-Timed:  Electrical Stimulation:         mins  63065 ( );  Dry Needling          mins self-pay  Traction          mins 53207  Low Eval          Mins  00380  Mod Eval          Mins  75768  High Eval                            Mins  77672  Re-Eval                               mins  30062    Timed Treatment:   30   mins   Total Treatment:     30   mins    Katie Colmenares, PT, DPT  Physical Therapist

## 2020-01-28 ENCOUNTER — TREATMENT (OUTPATIENT)
Dept: PHYSICAL THERAPY | Facility: CLINIC | Age: 70
End: 2020-01-28

## 2020-01-28 DIAGNOSIS — J01.00 ACUTE NON-RECURRENT MAXILLARY SINUSITIS: ICD-10-CM

## 2020-01-28 DIAGNOSIS — M48.061 NEUROFORAMINAL STENOSIS OF LUMBAR SPINE: Primary | ICD-10-CM

## 2020-01-28 DIAGNOSIS — M51.36 DEGENERATIVE DISC DISEASE, LUMBAR: ICD-10-CM

## 2020-01-28 DIAGNOSIS — M48.061 NEURAL FORAMINAL STENOSIS OF LUMBAR SPINE: ICD-10-CM

## 2020-01-28 DIAGNOSIS — M43.10 RETROLISTHESIS OF VERTEBRAE: ICD-10-CM

## 2020-01-28 PROCEDURE — 97110 THERAPEUTIC EXERCISES: CPT | Performed by: PHYSICAL THERAPIST

## 2020-01-28 NOTE — PROGRESS NOTES
Physical Therapy Daily Progress Note    VISIT#: 16    Subjective   Reyna Castano reports she is doing well.  Notices still slight more ability ambulate very short distances without cane, but gait does not look nice.     Pain Rating (0/10): 4    Objective     See Exercise, Manual, and Modality Logs for complete treatment.     Assessment & Plan     Assessment  Assessment details: Continued progress in strength/ROM and endurance.  Able to progress through more exercises with less rest time.         Plan  Continue current.           Timed:         Manual Therapy:         mins  05604;     Therapeutic Exercise:    30     mins  21533;     Neuromuscular Izabella:        mins  99597;    Therapeutic Activity:          mins  92600;     Gait Training:           mins  21545;     Ultrasound:          mins  29376;    Ionto                                   mins   44001  Self Care                            mins   60139    Un-Timed:  Electrical Stimulation:         mins  31213 ( );  Dry Needling          mins self-pay  Traction          mins 94250  Low Eval          Mins  39229  Mod Eval          Mins  53174  High Eval                            Mins  83099  Re-Eval                               mins  05623    Timed Treatment:   30   mins   Total Treatment:     30   mins    Katie Colmenares, PT, DPT  Physical Therapist

## 2020-01-30 ENCOUNTER — TREATMENT (OUTPATIENT)
Dept: PHYSICAL THERAPY | Facility: CLINIC | Age: 70
End: 2020-01-30

## 2020-01-30 DIAGNOSIS — J01.00 ACUTE NON-RECURRENT MAXILLARY SINUSITIS: ICD-10-CM

## 2020-01-30 DIAGNOSIS — M43.10 RETROLISTHESIS OF VERTEBRAE: ICD-10-CM

## 2020-01-30 DIAGNOSIS — M48.061 NEURAL FORAMINAL STENOSIS OF LUMBAR SPINE: ICD-10-CM

## 2020-01-30 DIAGNOSIS — M51.36 DEGENERATIVE DISC DISEASE, LUMBAR: ICD-10-CM

## 2020-01-30 DIAGNOSIS — M48.061 NEUROFORAMINAL STENOSIS OF LUMBAR SPINE: Primary | ICD-10-CM

## 2020-01-30 PROCEDURE — 97110 THERAPEUTIC EXERCISES: CPT | Performed by: PHYSICAL THERAPIST

## 2020-01-31 NOTE — PROGRESS NOTES
Physical Therapy Daily Progress Note    VISIT#: 4/12 in POC.     Subjective   Reyna Castano reports she is doing ok today.  Did well after last visit.     Pain Rating (0/10): 5    Objective     See Exercise, Manual, and Modality Logs for complete treatment.     Assessment & Plan     Assessment  Assessment details: Pt with good response to treatment today and showing improvement in her hip abduction lift and marching ROM.  Slight stability improvements.         Plan  Progress strengthening /stabilization /functional activity.             Timed:         Manual Therapy:         mins  28918;     Therapeutic Exercise:    30     mins  07512;     Neuromuscular Izabella:        mins  00637;    Therapeutic Activity:          mins  55518;     Gait Training:           mins  05740;     Ultrasound:         mins  44019;    Ionto                                   mins   37906  Self Care                            mins   57264    Un-Timed:  Electrical Stimulation:         mins  01350 ( );  Dry Needling          mins self-pay  Traction          mins 88307  Low Eval          Mins  97756  Mod Eval          Mins  74371  High Eval                            Mins  57956  Re-Eval                               mins  18281    Timed Treatment:  30    mins   Total Treatment:     30   mins    Katie Colmenares, PT, DPT  Physical Therapist

## 2020-02-06 ENCOUNTER — TREATMENT (OUTPATIENT)
Dept: PHYSICAL THERAPY | Facility: CLINIC | Age: 70
End: 2020-02-06

## 2020-02-06 DIAGNOSIS — J01.00 ACUTE NON-RECURRENT MAXILLARY SINUSITIS: ICD-10-CM

## 2020-02-06 DIAGNOSIS — M43.10 RETROLISTHESIS OF VERTEBRAE: ICD-10-CM

## 2020-02-06 DIAGNOSIS — M51.36 DEGENERATIVE DISC DISEASE, LUMBAR: ICD-10-CM

## 2020-02-06 DIAGNOSIS — M48.061 NEURAL FORAMINAL STENOSIS OF LUMBAR SPINE: ICD-10-CM

## 2020-02-06 DIAGNOSIS — M48.061 NEUROFORAMINAL STENOSIS OF LUMBAR SPINE: Primary | ICD-10-CM

## 2020-02-06 PROCEDURE — 97112 NEUROMUSCULAR REEDUCATION: CPT | Performed by: PHYSICAL THERAPIST

## 2020-02-06 PROCEDURE — 97110 THERAPEUTIC EXERCISES: CPT | Performed by: PHYSICAL THERAPIST

## 2020-02-06 NOTE — PROGRESS NOTES
Physical Therapy Daily Progress Note    VISIT#: 5/12 in POC.     Subjective   Reyna Castano reports she is doing ok today.  Feels that she is better able to raise up her R leg.  Leg pain significantly improved     Pain Rating (0/10): 3    Objective     See Exercise, Manual, and Modality Logs for complete treatment.       Assessment & Plan     Assessment  Assessment details: Pt with good response to treatment.  Improving endurance/tolerance to activity.         Plan  Progress strengthening /stabilization /functional activity            Timed:         Manual Therapy:        mins  00828;     Therapeutic Exercise:   30     mins  71122;     Neuromuscular Izabella:    10    mins  10921;    Therapeutic Activity:          mins  28556;     Gait Training:           mins  63775;     Ultrasound:          mins  88330;    Ionto                                   mins   71696  Self Care                            mins   80481    Un-Timed:  Electrical Stimulation:         mins  17025 ( );  Dry Needling          mins self-pay  Traction          mins 96859  Low Eval          Mins  10641  Mod Eval          Mins  42698  High Eval                            Mins  92804  Re-Eval                               mins  28540    Timed Treatment:  40    mins   Total Treatment:     40   mins    Katie Colmenares, PT, DPT  Physical Therapist

## 2020-02-10 ENCOUNTER — TREATMENT (OUTPATIENT)
Dept: PHYSICAL THERAPY | Facility: CLINIC | Age: 70
End: 2020-02-10

## 2020-02-10 DIAGNOSIS — M51.36 DEGENERATIVE DISC DISEASE, LUMBAR: ICD-10-CM

## 2020-02-10 DIAGNOSIS — M48.061 NEUROFORAMINAL STENOSIS OF LUMBAR SPINE: Primary | ICD-10-CM

## 2020-02-10 DIAGNOSIS — M48.061 NEURAL FORAMINAL STENOSIS OF LUMBAR SPINE: ICD-10-CM

## 2020-02-10 DIAGNOSIS — J01.00 ACUTE NON-RECURRENT MAXILLARY SINUSITIS: ICD-10-CM

## 2020-02-10 DIAGNOSIS — M43.10 RETROLISTHESIS OF VERTEBRAE: ICD-10-CM

## 2020-02-10 PROCEDURE — 97112 NEUROMUSCULAR REEDUCATION: CPT | Performed by: PHYSICAL THERAPIST

## 2020-02-10 PROCEDURE — 97110 THERAPEUTIC EXERCISES: CPT | Performed by: PHYSICAL THERAPIST

## 2020-02-10 NOTE — PROGRESS NOTES
Physical Therapy Daily Progress Note    VISIT#: 6/12 in POC.     Subjective   Reyna Castano reports she is still doing better.  Still has some difficulty getting her leg into the car.     Pain Rating (0/10): 5    Objective     See Exercise, Manual, and Modality Logs for complete treatment.       Assessment & Plan     Assessment  Assessment details: Improved standing tolerance.  R knee fatigued end of session.         Plan  Progress strengthening /stabilization /functional activity/balance activities as tolerated.             Timed:         Manual Therapy:         mins  55079;     Therapeutic Exercise:    29     mins  40505;     Neuromuscular Izabella:    10    mins  65227;    Therapeutic Activity:          mins  05805;     Gait Training:           mins  43011;     Ultrasound:          mins  68096;    Ionto                                   mins   12993  Self Care                            mins   72242    Un-Timed:  Electrical Stimulation:         mins  26484 ( );  Dry Needling          mins self-pay  Traction          mins 67868  Low Eval          Mins  84118  Mod Eval          Mins  91230  High Eval                            Mins  15708  Re-Eval                               mins  48187    Timed Treatment:   39   mins   Total Treatment:     39   mins    Katie Colmenares, PT, DPT  Physical Therapist

## 2020-02-14 ENCOUNTER — OFFICE VISIT (OUTPATIENT)
Dept: FAMILY MEDICINE CLINIC | Facility: CLINIC | Age: 70
End: 2020-02-14

## 2020-02-14 VITALS
TEMPERATURE: 97.6 F | HEIGHT: 67 IN | SYSTOLIC BLOOD PRESSURE: 168 MMHG | DIASTOLIC BLOOD PRESSURE: 83 MMHG | RESPIRATION RATE: 16 BRPM | BODY MASS INDEX: 37.51 KG/M2 | WEIGHT: 239 LBS | OXYGEN SATURATION: 96 % | HEART RATE: 83 BPM

## 2020-02-14 DIAGNOSIS — M43.10 RETROLISTHESIS OF VERTEBRAE: ICD-10-CM

## 2020-02-14 DIAGNOSIS — Z23 NEED FOR VACCINATION: ICD-10-CM

## 2020-02-14 DIAGNOSIS — E11.8 DISORDER ASSOCIATED WITH TYPE 2 DIABETES MELLITUS (HCC): ICD-10-CM

## 2020-02-14 DIAGNOSIS — E66.01 MORBIDLY OBESE (HCC): ICD-10-CM

## 2020-02-14 DIAGNOSIS — I10 ESSENTIAL HYPERTENSION: Primary | ICD-10-CM

## 2020-02-14 DIAGNOSIS — Z12.31 ENCOUNTER FOR SCREENING MAMMOGRAM FOR BREAST CANCER: ICD-10-CM

## 2020-02-14 LAB
ALBUMIN SERPL-MCNC: 4.2 G/DL (ref 3.5–5.2)
ALBUMIN/GLOB SERPL: 1.5 G/DL
ALP SERPL-CCNC: 80 U/L (ref 39–117)
ALT SERPL W P-5'-P-CCNC: 9 U/L (ref 1–33)
ANION GAP SERPL CALCULATED.3IONS-SCNC: 13.8 MMOL/L (ref 5–15)
AST SERPL-CCNC: 14 U/L (ref 1–32)
BILIRUB SERPL-MCNC: 0.5 MG/DL (ref 0.2–1.2)
BUN BLD-MCNC: 11 MG/DL (ref 8–23)
BUN/CREAT SERPL: 16.4 (ref 7–25)
CALCIUM SPEC-SCNC: 9.8 MG/DL (ref 8.6–10.5)
CHLORIDE SERPL-SCNC: 99 MMOL/L (ref 98–107)
CHOLEST SERPL-MCNC: 130 MG/DL (ref 0–200)
CO2 SERPL-SCNC: 25.2 MMOL/L (ref 22–29)
CREAT BLD-MCNC: 0.67 MG/DL (ref 0.57–1)
GFR SERPL CREATININE-BSD FRML MDRD: 87 ML/MIN/1.73
GLOBULIN UR ELPH-MCNC: 2.8 GM/DL
GLUCOSE BLD-MCNC: 179 MG/DL (ref 65–99)
HBA1C MFR BLD: 6.6 % (ref 3.5–5.6)
HDLC SERPL-MCNC: 66 MG/DL (ref 40–60)
LDLC SERPL CALC-MCNC: 42 MG/DL (ref 0–100)
LDLC/HDLC SERPL: 0.64 {RATIO}
POTASSIUM BLD-SCNC: 4.4 MMOL/L (ref 3.5–5.2)
PROT SERPL-MCNC: 7 G/DL (ref 6–8.5)
SODIUM BLD-SCNC: 138 MMOL/L (ref 136–145)
TRIGL SERPL-MCNC: 110 MG/DL (ref 0–150)
VLDLC SERPL-MCNC: 22 MG/DL (ref 5–40)

## 2020-02-14 PROCEDURE — 99214 OFFICE O/P EST MOD 30 MIN: CPT | Performed by: FAMILY MEDICINE

## 2020-02-14 PROCEDURE — 80053 COMPREHEN METABOLIC PANEL: CPT | Performed by: FAMILY MEDICINE

## 2020-02-14 PROCEDURE — 80061 LIPID PANEL: CPT | Performed by: FAMILY MEDICINE

## 2020-02-14 PROCEDURE — 90674 CCIIV4 VAC NO PRSV 0.5 ML IM: CPT | Performed by: FAMILY MEDICINE

## 2020-02-14 PROCEDURE — G0008 ADMIN INFLUENZA VIRUS VAC: HCPCS | Performed by: FAMILY MEDICINE

## 2020-02-14 PROCEDURE — 83036 HEMOGLOBIN GLYCOSYLATED A1C: CPT | Performed by: FAMILY MEDICINE

## 2020-02-14 PROCEDURE — 36415 COLL VENOUS BLD VENIPUNCTURE: CPT | Performed by: FAMILY MEDICINE

## 2020-02-14 NOTE — PROGRESS NOTES
Subjective   Chief Complaint   Patient presents with   • Follow-up     Reyna Castano is a 69 y.o. female.     Hypertension   This is a chronic problem. The current episode started more than 1 year ago. The problem has been waxing and waning since onset. The problem is uncontrolled. Pertinent negatives include no anxiety, blurred vision, chest pain, headaches, malaise/fatigue, neck pain, palpitations, peripheral edema or shortness of breath. There are no associated agents to hypertension. Current antihypertension treatment includes beta blockers. The current treatment provides moderate improvement. Compliance problems include exercise.    Back Pain   This is a chronic problem. The current episode started more than 1 month ago. The problem occurs daily. The problem has been gradually improving since onset. The pain is present in the lumbar spine. The quality of the pain is described as aching. Pertinent negatives include no abdominal pain, chest pain, fever, headaches, pelvic pain, tingling or weakness. Treatments tried: physical therapy. The treatment provided moderate relief.   Diabetes   She presents for her follow-up diabetic visit. She has type 2 diabetes mellitus. Pertinent negatives for hypoglycemia include no dizziness or headaches. Pertinent negatives for diabetes include no blurred vision, no chest pain, no polyuria and no weakness. Symptoms are stable. Current diabetic treatment includes oral agent (dual therapy). She is compliant with treatment most of the time. Her weight is increasing steadily. She is following a diabetic diet. She participates in exercise three times a week. Her breakfast blood glucose range is generally 130-140 mg/dl.      Past Medical History:   Diagnosis Date   • Diabetes mellitus (CMS/HCC)    • Hypertension    • Injury of back      Past Surgical History:   Procedure Laterality Date   • BACK SURGERY     • COLONOSCOPY     • HYSTERECTOMY       Allergies   Allergen Reactions   •  Erythromycin Diarrhea   • Penicillin G Unknown (See Comments)     Social History     Socioeconomic History   • Marital status:      Spouse name: Not on file   • Number of children: Not on file   • Years of education: Not on file   • Highest education level: Not on file   Tobacco Use   • Smoking status: Never Smoker   • Smokeless tobacco: Never Used   Substance and Sexual Activity   • Alcohol use: No     Frequency: Never     Comment: caffeine free drinks     Social History     Tobacco Use   Smoking Status Never Smoker   Smokeless Tobacco Never Used       family history is not on file.  Current Outpatient Medications on File Prior to Visit   Medication Sig Dispense Refill   • ACCU-CHEK SOFTCLIX LANCETS lancets ACCU-CHEK SOFTCLIX LANCETS     • atorvastatin (LIPITOR) 10 MG tablet TAKE 1 TABLET EVERY NIGHT FOR HIGH CHOLESTEROL 90 tablet 3   • Blood Glucose Monitoring Suppl (ACCU-CHEK JOHN PLUS) w/Device kit ACCU-CHEK JOHN PLUS w/Device KIT     • carvedilol (COREG) 12.5 MG tablet Take 1 tablet by mouth 2 (Two) Times a Day With Meals. 180 tablet 3   • gabapentin (NEURONTIN) 300 MG capsule TAKE 1 CAPSULE BY MOUTH AT BEDTIME **MAY INCREASE TO TWICE A DAY AS NEEDED 60 capsule 0   • glucose blood test strip 1 each by Other route Daily. Use as instructed 100 each 4   • HYDROcodone-acetaminophen (NORCO) 5-325 MG per tablet Take 1 tablet by mouth Every 6 (Six) Hours As Needed for Severe Pain . 28 tablet 0   • metFORMIN (GLUCOPHAGE) 500 MG tablet TAKE 1 TABLET TWICE DAILY 180 tablet 3   • TRADJENTA 5 MG tablet tablet TAKE 1 TABLET EVERY DAY FOR DIABETES 90 tablet 3     No current facility-administered medications on file prior to visit.      Patient Active Problem List   Diagnosis   • Disorder associated with type 2 diabetes mellitus (CMS/[facility])   • Encounter for general adult medical examination without abnormal findings   • Gastroesophageal reflux disease   • Hypertension   • Hypokalemia   • Acute non-recurrent maxillary  "sinusitis   • Retrolisthesis of vertebrae   • Neural foraminal stenosis of lumbar spine   • Morbidly obese (CMS/HCC)   • Anemia due to acute blood loss   • Hay fever   • Hematochezia   • Encounter for immunization   • Degenerative disc disease, lumbar       The following portions of the patient's history were reviewed and updated as appropriate: allergies, current medications, past family history, past medical history, past social history, past surgical history and problem list.    Review of Systems   Constitutional: Negative for chills, fever and malaise/fatigue.   HENT: Negative for sinus pressure and sore throat.    Eyes: Negative for blurred vision.   Respiratory: Negative for cough and shortness of breath.    Cardiovascular: Negative for chest pain and palpitations.   Gastrointestinal: Negative for abdominal pain.   Endocrine: Negative for polyuria.   Genitourinary: Negative for pelvic pain.   Musculoskeletal: Positive for back pain. Negative for neck pain.   Skin: Negative for rash.   Neurological: Negative for dizziness, tingling, weakness and headache.   Hematological: Negative for adenopathy.   Psychiatric/Behavioral: Negative for depressed mood.       Objective   /83 (BP Location: Left arm, Patient Position: Sitting, Cuff Size: Large Adult)   Pulse 83   Temp 97.6 °F (36.4 °C) (Oral)   Resp 16   Ht 170.2 cm (67\")   Wt 108 kg (239 lb)   SpO2 96%   BMI 37.43 kg/m²   Physical Exam   Constitutional: She is oriented to person, place, and time. She appears well-developed. No distress.   HENT:   Head: Normocephalic.   Eyes: Conjunctivae and lids are normal.   Neck: Trachea normal. No thyroid mass and no thyromegaly present.   Cardiovascular: Normal rate, regular rhythm and normal heart sounds.   Pulmonary/Chest: Effort normal and breath sounds normal.   Lymphadenopathy:     She has no cervical adenopathy.   Neurological: She is alert and oriented to person, place, and time.   Skin: Skin is warm and " dry.   Psychiatric: She has a normal mood and affect. Her speech is normal and behavior is normal. She is attentive.       No visits with results within 1 Week(s) from this visit.   Latest known visit with results is:   Admission on 09/20/2019, Discharged on 09/20/2019   Component Date Value Ref Range Status   • Color, UA 09/20/2019 Yellow  Yellow, Straw Final   • Appearance, UA 09/20/2019 Clear  Clear Final   • pH, UA 09/20/2019 6.5  5.0 - 8.0 Final   • Specific Gravity, UA 09/20/2019 1.011  1.005 - 1.030 Final   • Glucose, UA 09/20/2019 100 mg/dL (Trace)* Negative Final   • Ketones, UA 09/20/2019 Negative  Negative Final   • Bilirubin, UA 09/20/2019 Negative  Negative Final   • Blood, UA 09/20/2019 Small (1+)* Negative Final   • Protein, UA 09/20/2019 100 mg/dL (2+)* Negative Final   • Leuk Esterase, UA 09/20/2019 Negative  Negative Final   • Nitrite, UA 09/20/2019 Negative  Negative Final   • Urobilinogen, UA 09/20/2019 0.2 E.U./dL  0.2 - 1.0 E.U./dL Final   • RBC, UA 09/20/2019 0-2* None Seen /HPF Final   • WBC, UA 09/20/2019 0-2* None Seen /HPF Final   • Bacteria, UA 09/20/2019 None Seen  None Seen /HPF Final   • Squamous Epithelial Cells, UA 09/20/2019 None Seen  None Seen, 0-2 /HPF Final   • Hyaline Casts, UA 09/20/2019 0-2  None Seen /LPF Final   • Methodology 09/20/2019 Automated Microscopy   Final           Assessment/Plan   Problems Addressed this Visit        Cardiovascular and Mediastinum    Hypertension - Primary     Hypertension is improving with treatment.  Continue current treatment regimen.  Blood pressure will be reassessed at the next regular appointment.         Relevant Orders    Comprehensive Metabolic Panel       Endocrine    Disorder associated with type 2 diabetes mellitus (CMS/[facility])     Diabetes is unchanged.   Continue current treatment regimen.  Diabetes will be reassessed in 6 months.         Relevant Orders    Hemoglobin A1c    Comprehensive Metabolic Panel    Lipid Panel        Musculoskeletal and Integument    Retrolisthesis of vertebrae     Still in PT.  Slowly progressing.  Back to work since 1/7/20.         Relevant Orders    Comprehensive Metabolic Panel      Other Visit Diagnoses     Encounter for screening mammogram for breast cancer        Relevant Orders    Mammo Screening Digital Tomosynthesis Bilateral With CAD    Need for vaccination        Relevant Orders    Flucelvax Quad=>4Years ([id number]) (Completed)

## 2020-02-17 ENCOUNTER — TREATMENT (OUTPATIENT)
Dept: PHYSICAL THERAPY | Facility: CLINIC | Age: 70
End: 2020-02-17

## 2020-02-17 DIAGNOSIS — M48.061 NEURAL FORAMINAL STENOSIS OF LUMBAR SPINE: ICD-10-CM

## 2020-02-17 DIAGNOSIS — M43.10 RETROLISTHESIS OF VERTEBRAE: ICD-10-CM

## 2020-02-17 DIAGNOSIS — J01.00 ACUTE NON-RECURRENT MAXILLARY SINUSITIS: ICD-10-CM

## 2020-02-17 DIAGNOSIS — M51.36 DEGENERATIVE DISC DISEASE, LUMBAR: ICD-10-CM

## 2020-02-17 DIAGNOSIS — M48.061 NEUROFORAMINAL STENOSIS OF LUMBAR SPINE: Primary | ICD-10-CM

## 2020-02-17 PROCEDURE — 97110 THERAPEUTIC EXERCISES: CPT | Performed by: PHYSICAL THERAPIST

## 2020-02-18 NOTE — PROGRESS NOTES
Physical Therapy Daily Progress Note    VISIT#: 20/28 in POC. 7/8 per auth.      Subjective   Reyna Castano reports she had a rough day at work, but is doing ok.  Had a rough day on Saturday also, but is doing better today.  Feels she is moving better today.  Thinks maybe the cold/rain does affect her pain level.     Pain Rating (0/10): 4    Objective     See Exercise, Manual, and Modality Logs for complete treatment.     Assessment & Plan     Assessment  Assessment details: Pt seemed to be moving with improved pace/speed of movement today.  Fatigues quickly still with therex.        Plan  Reassess next visit for additional auth.             Timed:         Manual Therapy:         mins  24542;     Therapeutic Exercise:     30    mins  34023;     Neuromuscular Izabella:        mins  07227;    Therapeutic Activity:          mins  97583;     Gait Training:           mins  91430;     Ultrasound:          mins  33567;    Ionto                                   mins   95681  Self Care                            mins   82030    Un-Timed:  Electrical Stimulation:         mins  58538 ( );  Dry Needling          mins self-pay  Traction          mins 21024  Low Eval          Mins  48246  Mod Eval          Mins  52436  High Eval                            Mins  94145  Re-Eval                               mins  24478    Timed Treatment:   30   mins   Total Treatment:     30   mins    Katie Colmenares, PT, DPT  Physical Therapist

## 2020-02-20 ENCOUNTER — TREATMENT (OUTPATIENT)
Dept: PHYSICAL THERAPY | Facility: CLINIC | Age: 70
End: 2020-02-20

## 2020-02-20 DIAGNOSIS — M48.061 NEUROFORAMINAL STENOSIS OF LUMBAR SPINE: Primary | ICD-10-CM

## 2020-02-20 DIAGNOSIS — J01.00 ACUTE NON-RECURRENT MAXILLARY SINUSITIS: ICD-10-CM

## 2020-02-20 DIAGNOSIS — M48.061 NEURAL FORAMINAL STENOSIS OF LUMBAR SPINE: ICD-10-CM

## 2020-02-20 DIAGNOSIS — M51.36 DEGENERATIVE DISC DISEASE, LUMBAR: ICD-10-CM

## 2020-02-20 DIAGNOSIS — M43.10 RETROLISTHESIS OF VERTEBRAE: ICD-10-CM

## 2020-02-20 PROCEDURE — 97110 THERAPEUTIC EXERCISES: CPT | Performed by: PHYSICAL THERAPIST

## 2020-02-21 NOTE — PROGRESS NOTES
Physical Therapy Daily Progress Note    VISIT#: 21/28 in POC. Visit 8/8 per ins.     Subjective   Reyna Castano reports she is doing better today.  Feels she is getting stronger.      Pain Rating (0/10): 4    Objective     See Exercise, Manual, and Modality Logs for complete treatment.       Assessment & Plan     Assessment  Assessment details: Pt fatigued quickly with standing hip exercises today.  Still has significant weakness/difficulty with standing heel raises and requires use of BUE assist on counter to get improved ROM.          Plan  Progress per Plan of Care - HEP upgrade/provide handout.             Timed:         Manual Therapy:         mins  46981;     Therapeutic Exercise:    30     mins  69587;     Neuromuscular Izabella:        mins  76428;    Therapeutic Activity:          mins  22535;     Gait Training:           mins  00915;     Ultrasound:          mins  27974;    Ionto                                   mins   40255  Self Care                            mins   27401    Un-Timed:  Electrical Stimulation:         mins  43194 ( );  Dry Needling          mins self-pay  Traction          mins 71727  Low Eval          Mins  90153  Mod Eval          Mins  35501  High Eval                            Mins  24210  Re-Eval                               mins  66947    Timed Treatment:   30   mins   Total Treatment:     30   mins    Katie Colmenares, PT, DPT  Physical Therapist

## 2020-02-24 ENCOUNTER — TREATMENT (OUTPATIENT)
Dept: PHYSICAL THERAPY | Facility: CLINIC | Age: 70
End: 2020-02-24

## 2020-02-24 DIAGNOSIS — M48.061 NEURAL FORAMINAL STENOSIS OF LUMBAR SPINE: ICD-10-CM

## 2020-02-24 DIAGNOSIS — M43.10 RETROLISTHESIS OF VERTEBRAE: ICD-10-CM

## 2020-02-24 DIAGNOSIS — M51.36 DEGENERATIVE DISC DISEASE, LUMBAR: ICD-10-CM

## 2020-02-24 DIAGNOSIS — J01.00 ACUTE NON-RECURRENT MAXILLARY SINUSITIS: ICD-10-CM

## 2020-02-24 DIAGNOSIS — M48.061 NEUROFORAMINAL STENOSIS OF LUMBAR SPINE: Primary | ICD-10-CM

## 2020-02-24 PROCEDURE — 97110 THERAPEUTIC EXERCISES: CPT | Performed by: PHYSICAL THERAPIST

## 2020-02-25 NOTE — PROGRESS NOTES
Physical Therapy Daily Progress Note    VISIT#: 22/28 in POC. Auth expires 8/17/2020 (no visit limit).     Subjective   Reyna Castano reports she did not have a great weekend as she was feeling very stiff.  She did do a lot of walking.  Doing better today.     Pain Rating (0/10): 4    Objective     See Exercise, Manual, and Modality Logs for complete treatment.     Assessment & Plan     Assessment  Assessment details: Pt fatigued end of session.  Step ups trialed, but caused increased R knee pain.  Pt doing well with current exercises, but difficult to progress 2' lack of endurance still and pain increase.         Plan  Progress strengthening /stabilization /functional activity.             Timed:         Manual Therapy:         mins  24906;     Therapeutic Exercise:    30     mins  57565;     Neuromuscular Izabella:        mins  39235;    Therapeutic Activity:          mins  64340;     Gait Training:           mins  83213;     Ultrasound:          mins  91381;    Ionto                                   mins   93911  Self Care                            mins   60943    Un-Timed:  Electrical Stimulation:         mins  67742 ( );  Dry Needling          mins self-pay  Traction          mins 15842  Low Eval          Mins  60840  Mod Eval          Mins  93162  High Eval                            Mins  76561  Re-Eval                               mins  61708    Timed Treatment:   30   mins   Total Treatment:     30   mins    Katie Colmenares, PT, DPT  Physical Therapist

## 2020-03-12 ENCOUNTER — TREATMENT (OUTPATIENT)
Dept: PHYSICAL THERAPY | Facility: CLINIC | Age: 70
End: 2020-03-12

## 2020-03-12 DIAGNOSIS — J01.00 ACUTE NON-RECURRENT MAXILLARY SINUSITIS: ICD-10-CM

## 2020-03-12 DIAGNOSIS — M43.10 RETROLISTHESIS OF VERTEBRAE: ICD-10-CM

## 2020-03-12 DIAGNOSIS — M51.36 DEGENERATIVE DISC DISEASE, LUMBAR: ICD-10-CM

## 2020-03-12 DIAGNOSIS — M48.061 NEUROFORAMINAL STENOSIS OF LUMBAR SPINE: Primary | ICD-10-CM

## 2020-03-12 DIAGNOSIS — M48.061 NEURAL FORAMINAL STENOSIS OF LUMBAR SPINE: ICD-10-CM

## 2020-03-12 PROCEDURE — 97110 THERAPEUTIC EXERCISES: CPT | Performed by: PHYSICAL THERAPIST

## 2020-03-13 NOTE — PROGRESS NOTES
Physical Therapy Daily Progress Note    VISIT#: 23/28.  Auth expires 8/17/2020 (no visit limit).     Subjective   Reyna Castano reports she is doing ok.  She was having increased knee pain/R leg pain over the last couple weeks where the leg was giving way on her.  She is doing better now.    Pain Rating (0/10): 3    Objective     See Exercise, Manual, and Modality Logs for complete treatment.     Assessment & Plan     Assessment  Assessment details: Able to resume therapy with issue.  Still fatigued quickly.          Plan  Continue current and progress.            Timed:         Manual Therapy:         mins  32638;     Therapeutic Exercise:    30     mins  87535;     Neuromuscular Izabella:        mins  50485;    Therapeutic Activity:          mins  44446;     Gait Training:           mins  81912;     Ultrasound:          mins  93640;    Ionto                                   mins   21971  Self Care                            mins   72358    Un-Timed:  Electrical Stimulation:         mins  28697 ( );  Dry Needling          mins self-pay  Traction          mins 14197  Low Eval          Mins  74517  Mod Eval          Mins  20869  High Eval                            Mins  63270  Re-Eval                               mins  58593    Timed Treatment:   30   mins   Total Treatment:     30   mins    Katie Colmenares, PT, DPT  Physical Therapist

## 2020-06-12 RX ORDER — LANCETS
EACH MISCELLANEOUS
Qty: 100 EACH | Refills: 1 | Status: SHIPPED | OUTPATIENT
Start: 2020-06-12 | End: 2020-10-30

## 2020-06-15 ENCOUNTER — DOCUMENTATION (OUTPATIENT)
Dept: PHYSICAL THERAPY | Facility: CLINIC | Age: 70
End: 2020-06-15

## 2020-06-15 NOTE — PROGRESS NOTES
Discharge Summary  Discharge Summary from Physical Therapy Report      Date of Initial PT visit: 11/11/2019  Number of Visits Seen: 23    Discharge Status of Patient: Showing progress overall since beginning PT in low back pain and walking ability.  Still limited in overall mobility and relies on use of SPC.  Has not returned since COVID-19 precautions/restrictions.       Goals: Partially Met   SHORT TERM GOALS: Time for Goal Achievement: 4 weeks    1.  Patient to be compliant and progression of HEP. - met                   2.  Pain level < 7/10 at worst with mentioned activities to improve function and ability to sit to perform job. - met  3.  Increased lumbar and SIJ mobility to allow for increased lumbar AROM with less pain. - progressing  4.  Increased lumbar AROM to by 25% in all planes to allow for increased ease with sit-stand transfers and functional activities. - met    LONG TERM GOALS: Time for Goal Achievement: 2 months  1.  Pt. to score < 40% on Back Index. - not met but improved (46%).   2.  Pain level < 4/10 with all listed activities to return to higher level of function. - not met  3.  Lumbar AROM to WFL to allow for return to household, work, & recreational activities w/o increased symptoms. - not met  4.  (B) LE and lower abdominal strength to 4+ to 5/5 to allow for pushing, pulling and activities to occur without pain (driving, sitting, household  & job requirements). - not met  5. Pt to be able to ambulate without use of an AD for household and community distances with proper gait mechanics and min to no reports of R thigh pain. - not met      Discharge Plan: Continue with current home exercise program as instructed    Date of Discharge: 06/15/2020    Katie Colmenares, PT, DPT  Physical Therapist

## 2020-06-19 RX ORDER — BLOOD-GLUCOSE METER
EACH MISCELLANEOUS
Qty: 1 KIT | Refills: 0 | Status: SHIPPED | OUTPATIENT
Start: 2020-06-19 | End: 2022-04-11 | Stop reason: SDUPTHER

## 2020-06-26 RX ORDER — CARVEDILOL 12.5 MG/1
12.5 TABLET ORAL 2 TIMES DAILY WITH MEALS
Qty: 180 TABLET | Refills: 2 | Status: SHIPPED | OUTPATIENT
Start: 2020-06-26 | End: 2021-01-29

## 2020-08-13 NOTE — PROGRESS NOTES
Subjective   Reyna Castano is a 70 y.o. female and is here for a comprehensive physical exam. The patient reports no problems.    Do you take any herbs or supplements that were not prescribed by a doctor? no  Are you taking calcium supplements? no  Are you taking aspirin daily? no    The following portions of the patient's history were reviewed and updated as appropriate: allergies, current medications, past family history, past medical history, past social history, past surgical history and problem list.    Review of Systems  Do you have pain that bothers you in your daily life? no  A comprehensive review of systems was negative.    Objective   BP (!) 181/99 (BP Location: Left arm, Patient Position: Sitting, Cuff Size: Adult)   Pulse 67   Temp 97.8 °F (36.6 °C)   Wt 110 kg (242 lb)   SpO2 95%   BMI 37.90 kg/m²   General appearance: alert, appears stated age and cooperative  Head: Normocephalic, without obvious abnormality, atraumatic  Eyes: conjunctivae/corneas clear. PERRL, EOM's intact. Fundi benign.  Neck: no adenopathy, supple, symmetrical, trachea midline and thyroid not enlarged, symmetric, no tenderness/mass/nodules  Lungs: clear to auscultation bilaterally  Heart: regular rate and rhythm, S1, S2 normal, no murmur, click, rub or gallop  Extremities: extremities normal, atraumatic, no cyanosis or edema  Skin: Skin color, texture, turgor normal. No rashes or lesions  Lymph nodes: Cervical, supraclavicular, and axillary nodes normal.  Neurologic: Grossly normal     No visits with results within 1 Week(s) from this visit.   Latest known visit with results is:   Office Visit on 02/14/2020   Component Date Value Ref Range Status   • Hemoglobin A1C 02/14/2020 6.6* 3.5 - 5.6 % Final   • Glucose 02/14/2020 179* 65 - 99 mg/dL Final   • BUN 02/14/2020 11  8 - 23 mg/dL Final   • Creatinine 02/14/2020 0.67  0.57 - 1.00 mg/dL Final   • Sodium 02/14/2020 138  136 - 145 mmol/L Final   • Potassium 02/14/2020 4.4  3.5 -  5.2 mmol/L Final   • Chloride 02/14/2020 99  98 - 107 mmol/L Final   • CO2 02/14/2020 25.2  22.0 - 29.0 mmol/L Final   • Calcium 02/14/2020 9.8  8.6 - 10.5 mg/dL Final   • Total Protein 02/14/2020 7.0  6.0 - 8.5 g/dL Final   • Albumin 02/14/2020 4.20  3.50 - 5.20 g/dL Final   • ALT (SGPT) 02/14/2020 9  1 - 33 U/L Final   • AST (SGOT) 02/14/2020 14  1 - 32 U/L Final   • Alkaline Phosphatase 02/14/2020 80  39 - 117 U/L Final   • Total Bilirubin 02/14/2020 0.5  0.2 - 1.2 mg/dL Final   • eGFR Non African Amer 02/14/2020 87  >60 mL/min/1.73 Final   • Globulin 02/14/2020 2.8  gm/dL Final   • A/G Ratio 02/14/2020 1.5  g/dL Final   • BUN/Creatinine Ratio 02/14/2020 16.4  7.0 - 25.0 Final   • Anion Gap 02/14/2020 13.8  5.0 - 15.0 mmol/L Final   • Total Cholesterol 02/14/2020 130  0 - 200 mg/dL Final   • Triglycerides 02/14/2020 110  0 - 150 mg/dL Final   • HDL Cholesterol 02/14/2020 66* 40 - 60 mg/dL Final   • LDL Cholesterol  02/14/2020 42  0 - 100 mg/dL Final   • VLDL Cholesterol 02/14/2020 22  5 - 40 mg/dL Final   • LDL/HDL Ratio 02/14/2020 0.64   Final       Assessment/Plan   Healthy female exam.   There are no diagnoses linked to this encounter.  1. Wellness exam  2. Patient Counseling:  --Nutrition: Stressed importance of moderation in sodium/caffeine intake, saturated fat and cholesterol, caloric balance, sufficient intake of fresh fruits, vegetables, fiber, calcium, iron, and 1 mg of folate supplement per day (for females capable of pregnancy).  --Exercise: Stressed the importance of regular exercise.   --Injury prevention: Discussed safety belts, safety helmets, smoke detector, smoking near bedding or upholstery.   --Dental health: Discussed importance of regular tooth brushing, flossing, and dental visits.  --Immunizations reviewed.    3. Discussed the patient's BMI with her.  The BMI is above average; BMI management plan is completed  4. Follow up in one year         Medicare Wellness Visit   The ABC's of the  Annual Wellness Visit    No chief complaint on file.      HPI:  Reyna Castano, -1950, is a 70 y.o. female who presents for a Medicare Wellness Visit.    Recent Hospitalizations:  No hospitalization(s) within the last year..    Current Medical Providers:  Patient Care Team:  Keturah Grewal MD as PCP - General (Family Medicine)  Keturah Grewal MD as PCP - Claims Attributed    Health Habits and Functional and Cognitive Screening and Depression Screening:  No flowsheet data found.    Compared to one year ago, the patient feels her physical health is the same and her mental health is the same.    Depression Screen:  PHQ-2/PHQ-9 Depression Screening 2020   Little interest or pleasure in doing things 0   Feeling down, depressed, or hopeless 0   Total Score 0         Past Medical/Family/Social History:  The following portions of the patient's history were reviewed and updated as appropriate: allergies, current medications, past family history, past medical history, past social history, past surgical history and problem list.    Allergies   Allergen Reactions   • Erythromycin Diarrhea   • Penicillin G Unknown (See Comments)         Current Outpatient Medications:   •  Accu-Chek Softclix Lancets lancets, USE WITH GLUCOMETER TO TEST BLOOD SUGAR ONE TIME PER DAY, Disp: 100 each, Rfl: 1  •  atorvastatin (LIPITOR) 10 MG tablet, TAKE 1 TABLET EVERY NIGHT FOR HIGH CHOLESTEROL, Disp: 90 tablet, Rfl: 3  •  Blood Glucose Monitoring Suppl (ACCU-CHEK JOHN PLUS) w/Device kit, Use to check blood sugar once daily E11.9, Disp: 1 kit, Rfl: 0  •  carvedilol (COREG) 12.5 MG tablet, TAKE 1 TABLET BY MOUTH 2 (TWO) TIMES A DAY WITH MEALS., Disp: 180 tablet, Rfl: 2  •  gabapentin (NEURONTIN) 300 MG capsule, TAKE 1 CAPSULE BY MOUTH AT BEDTIME **MAY INCREASE TO TWICE A DAY AS NEEDED, Disp: 60 capsule, Rfl: 0  •  glucose blood test strip, 1 each by Other route Daily. Use as instructed, Disp: 100 each, Rfl: 4  •   HYDROcodone-acetaminophen (NORCO) 5-325 MG per tablet, Take 1 tablet by mouth Every 6 (Six) Hours As Needed for Severe Pain ., Disp: 28 tablet, Rfl: 0  •  metFORMIN (GLUCOPHAGE) 500 MG tablet, TAKE 1 TABLET TWICE DAILY, Disp: 180 tablet, Rfl: 3  •  TRADJENTA 5 MG tablet tablet, TAKE 1 TABLET EVERY DAY FOR DIABETES, Disp: 90 tablet, Rfl: 3    Aspirin use counseling: Start ASA 81 mg daily     Current medication list contains no high risk medications.  No harmful drug interactions have been identified.     No family history on file.    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 (CMS/HCC)   • Encounter for general adult medical examination without abnormal findings   • Gastroesophageal reflux disease   • Hypertension   • Hypokalemia   • Acute non-recurrent maxillary sinusitis   • Retrolisthesis of vertebrae   • Neural foraminal stenosis of lumbar spine   • Morbidly obese (CMS/HCC)   • Anemia due to acute blood loss   • Hay fever   • Hematochezia   • Encounter for immunization   • Degenerative disc disease, lumbar   • Encounter for screening mammogram for breast cancer   • Need for vaccination       Review of Systems    Objective     There were no vitals filed for this visit.    Patient's There is no height or weight on file to calculate BMI. BMI is above normal parameters. Recommendations include: exercise counseling.      No exam data present    The patient has no evidence of cognitve impairment.     Physical Exam    Recent Lab Results:     Lab Results   Component Value Date    CHOL 130 02/14/2020    TRIG 110 02/14/2020    HDL 66 (H) 02/14/2020    VLDL 22  02/14/2020    LDLHDL 0.64 02/14/2020     No visits with results within 1 Week(s) from this visit.   Latest known visit with results is:   Office Visit on 02/14/2020   Component Date Value Ref Range Status   • Hemoglobin A1C 02/14/2020 6.6* 3.5 - 5.6 % Final   • Glucose 02/14/2020 179* 65 - 99 mg/dL Final   • BUN 02/14/2020 11  8 - 23 mg/dL Final   • Creatinine 02/14/2020 0.67  0.57 - 1.00 mg/dL Final   • Sodium 02/14/2020 138  136 - 145 mmol/L Final   • Potassium 02/14/2020 4.4  3.5 - 5.2 mmol/L Final   • Chloride 02/14/2020 99  98 - 107 mmol/L Final   • CO2 02/14/2020 25.2  22.0 - 29.0 mmol/L Final   • Calcium 02/14/2020 9.8  8.6 - 10.5 mg/dL Final   • Total Protein 02/14/2020 7.0  6.0 - 8.5 g/dL Final   • Albumin 02/14/2020 4.20  3.50 - 5.20 g/dL Final   • ALT (SGPT) 02/14/2020 9  1 - 33 U/L Final   • AST (SGOT) 02/14/2020 14  1 - 32 U/L Final   • Alkaline Phosphatase 02/14/2020 80  39 - 117 U/L Final   • Total Bilirubin 02/14/2020 0.5  0.2 - 1.2 mg/dL Final   • eGFR Non African Amer 02/14/2020 87  >60 mL/min/1.73 Final   • Globulin 02/14/2020 2.8  gm/dL Final   • A/G Ratio 02/14/2020 1.5  g/dL Final   • BUN/Creatinine Ratio 02/14/2020 16.4  7.0 - 25.0 Final   • Anion Gap 02/14/2020 13.8  5.0 - 15.0 mmol/L Final   • Total Cholesterol 02/14/2020 130  0 - 200 mg/dL Final   • Triglycerides 02/14/2020 110  0 - 150 mg/dL Final   • HDL Cholesterol 02/14/2020 66* 40 - 60 mg/dL Final   • LDL Cholesterol  02/14/2020 42  0 - 100 mg/dL Final   • VLDL Cholesterol 02/14/2020 22  5 - 40 mg/dL Final   • LDL/HDL Ratio 02/14/2020 0.64   Final         Assessment/Plan   Age-appropriate Screening Schedule:  Refer to the list below for future screening recommendations based on patient's age, sex and/or medical conditions.      Health Maintenance   Topic Date Due   • MAMMOGRAM  1950   • URINE MICROALBUMIN  1950   • TDAP/TD VACCINES (1 - Tdap) 08/08/1960   • ZOSTER VACCINE (1 of 2) 08/08/1999   • DIABETIC FOOT EXAM   08/16/2019   • DIABETIC EYE EXAM  08/16/2019   • INFLUENZA VACCINE  08/01/2020   • HEMOGLOBIN A1C  08/14/2020   • COLONOSCOPY  02/20/2026       Medicare Risks and Personalized Health Plan:  Advance Directive Discussion  Breast Cancer/Mammogram Screening  Diabetic Lab Screening       CMS-Preventive Services Quick Reference  Medicare Preventive Services Addressed:  Annual Wellness Visit (AWV)  Diabetes Screening-Lab Order for either glucose quantitative blood (except reagent strip), glucose;post glucose dose(includes glucose), or glucose tolerance test-3 specimens(includes glucose)  Screening Mammography     Advance Care Planning:  ACP discussion was held with the patient during this visit. Patient does not have an advance directive, information provided.    There are no diagnoses linked to this encounter.    An After Visit Summary and PPPS with all of these plans were given to the patient.      Follow Up:  No follow-ups on file.

## 2020-08-14 ENCOUNTER — OFFICE VISIT (OUTPATIENT)
Dept: FAMILY MEDICINE CLINIC | Facility: CLINIC | Age: 70
End: 2020-08-14

## 2020-08-14 ENCOUNTER — LAB (OUTPATIENT)
Dept: FAMILY MEDICINE CLINIC | Facility: CLINIC | Age: 70
End: 2020-08-14

## 2020-08-14 VITALS
SYSTOLIC BLOOD PRESSURE: 181 MMHG | HEART RATE: 67 BPM | DIASTOLIC BLOOD PRESSURE: 99 MMHG | TEMPERATURE: 97.8 F | OXYGEN SATURATION: 95 % | BODY MASS INDEX: 37.9 KG/M2 | WEIGHT: 242 LBS

## 2020-08-14 DIAGNOSIS — E11.8 DISORDER ASSOCIATED WITH TYPE 2 DIABETES MELLITUS (HCC): ICD-10-CM

## 2020-08-14 DIAGNOSIS — Z00.00 WELLNESS EXAMINATION: Primary | ICD-10-CM

## 2020-08-14 DIAGNOSIS — I10 ESSENTIAL HYPERTENSION: ICD-10-CM

## 2020-08-14 LAB
ALBUMIN SERPL-MCNC: 4.1 G/DL (ref 3.5–5.2)
ALBUMIN/GLOB SERPL: 1.7 G/DL
ALP SERPL-CCNC: 78 U/L (ref 39–117)
ALT SERPL W P-5'-P-CCNC: 12 U/L (ref 1–33)
ANION GAP SERPL CALCULATED.3IONS-SCNC: 11 MMOL/L (ref 5–15)
AST SERPL-CCNC: 16 U/L (ref 1–32)
BILIRUB SERPL-MCNC: 0.6 MG/DL (ref 0–1.2)
BUN SERPL-MCNC: 8 MG/DL (ref 8–23)
BUN/CREAT SERPL: 10.8 (ref 7–25)
CALCIUM SPEC-SCNC: 9.4 MG/DL (ref 8.6–10.5)
CHLORIDE SERPL-SCNC: 103 MMOL/L (ref 98–107)
CHOLEST SERPL-MCNC: 132 MG/DL (ref 0–200)
CO2 SERPL-SCNC: 27 MMOL/L (ref 22–29)
CREAT SERPL-MCNC: 0.74 MG/DL (ref 0.57–1)
GFR SERPL CREATININE-BSD FRML MDRD: 77 ML/MIN/1.73
GLOBULIN UR ELPH-MCNC: 2.4 GM/DL
GLUCOSE SERPL-MCNC: 155 MG/DL (ref 65–99)
HBA1C MFR BLD: 6.6 % (ref 3.5–5.6)
HDLC SERPL-MCNC: 61 MG/DL (ref 40–60)
LDLC SERPL CALC-MCNC: 49 MG/DL (ref 0–100)
LDLC/HDLC SERPL: 0.81 {RATIO}
POTASSIUM SERPL-SCNC: 4.2 MMOL/L (ref 3.5–5.2)
PROT SERPL-MCNC: 6.5 G/DL (ref 6–8.5)
SODIUM SERPL-SCNC: 141 MMOL/L (ref 136–145)
TRIGL SERPL-MCNC: 108 MG/DL (ref 0–150)
VLDLC SERPL-MCNC: 21.6 MG/DL (ref 5–40)

## 2020-08-14 PROCEDURE — 80061 LIPID PANEL: CPT | Performed by: FAMILY MEDICINE

## 2020-08-14 PROCEDURE — 83036 HEMOGLOBIN GLYCOSYLATED A1C: CPT | Performed by: FAMILY MEDICINE

## 2020-08-14 PROCEDURE — 36415 COLL VENOUS BLD VENIPUNCTURE: CPT | Performed by: FAMILY MEDICINE

## 2020-08-14 PROCEDURE — 80053 COMPREHEN METABOLIC PANEL: CPT | Performed by: FAMILY MEDICINE

## 2020-08-14 PROCEDURE — 99397 PER PM REEVAL EST PAT 65+ YR: CPT | Performed by: FAMILY MEDICINE

## 2020-10-30 RX ORDER — LANCETS
EACH MISCELLANEOUS
Qty: 100 EACH | Refills: 1 | Status: SHIPPED | OUTPATIENT
Start: 2020-10-30 | End: 2021-03-10

## 2020-11-09 RX ORDER — LINAGLIPTIN 5 MG/1
TABLET, FILM COATED ORAL
Qty: 90 TABLET | Refills: 3 | Status: SHIPPED | OUTPATIENT
Start: 2020-11-09 | End: 2021-09-03

## 2020-11-09 RX ORDER — ATORVASTATIN CALCIUM 10 MG/1
TABLET, FILM COATED ORAL
Qty: 90 TABLET | Refills: 3 | Status: SHIPPED | OUTPATIENT
Start: 2020-11-09 | End: 2021-09-03

## 2021-01-29 RX ORDER — CARVEDILOL 12.5 MG/1
TABLET ORAL
Qty: 180 TABLET | Refills: 2 | Status: SHIPPED | OUTPATIENT
Start: 2021-01-29 | End: 2021-09-03

## 2021-02-01 RX ORDER — BLOOD SUGAR DIAGNOSTIC
STRIP MISCELLANEOUS
Qty: 100 EACH | Refills: 1 | Status: SHIPPED | OUTPATIENT
Start: 2021-02-01 | End: 2021-06-28

## 2021-02-12 ENCOUNTER — LAB (OUTPATIENT)
Dept: LAB | Facility: HOSPITAL | Age: 71
End: 2021-02-12

## 2021-02-12 ENCOUNTER — OFFICE VISIT (OUTPATIENT)
Dept: FAMILY MEDICINE CLINIC | Facility: CLINIC | Age: 71
End: 2021-02-12

## 2021-02-12 VITALS
WEIGHT: 240 LBS | OXYGEN SATURATION: 96 % | SYSTOLIC BLOOD PRESSURE: 192 MMHG | HEART RATE: 80 BPM | DIASTOLIC BLOOD PRESSURE: 112 MMHG | TEMPERATURE: 97.1 F | BODY MASS INDEX: 37.59 KG/M2

## 2021-02-12 DIAGNOSIS — E66.01 MORBIDLY OBESE (HCC): ICD-10-CM

## 2021-02-12 DIAGNOSIS — E11.8 DISORDER ASSOCIATED WITH TYPE 2 DIABETES MELLITUS (HCC): ICD-10-CM

## 2021-02-12 DIAGNOSIS — I10 ESSENTIAL HYPERTENSION: Primary | ICD-10-CM

## 2021-02-12 LAB
ALBUMIN SERPL-MCNC: 4 G/DL (ref 3.5–5.2)
ALBUMIN/GLOB SERPL: 1.4 G/DL
ALP SERPL-CCNC: 95 U/L (ref 39–117)
ALT SERPL W P-5'-P-CCNC: 11 U/L (ref 1–33)
ANION GAP SERPL CALCULATED.3IONS-SCNC: 11 MMOL/L (ref 5–15)
AST SERPL-CCNC: 13 U/L (ref 1–32)
BILIRUB SERPL-MCNC: 0.5 MG/DL (ref 0–1.2)
BUN SERPL-MCNC: 11 MG/DL (ref 8–23)
BUN/CREAT SERPL: 14.3 (ref 7–25)
CALCIUM SPEC-SCNC: 9.5 MG/DL (ref 8.6–10.5)
CHLORIDE SERPL-SCNC: 103 MMOL/L (ref 98–107)
CHOLEST SERPL-MCNC: 136 MG/DL (ref 0–200)
CO2 SERPL-SCNC: 26 MMOL/L (ref 22–29)
CREAT SERPL-MCNC: 0.77 MG/DL (ref 0.57–1)
GFR SERPL CREATININE-BSD FRML MDRD: 74 ML/MIN/1.73
GLOBULIN UR ELPH-MCNC: 2.8 GM/DL
GLUCOSE SERPL-MCNC: 142 MG/DL (ref 65–99)
HBA1C MFR BLD: 6.7 % (ref 3.5–5.6)
HDLC SERPL-MCNC: 68 MG/DL (ref 40–60)
LDLC SERPL CALC-MCNC: 52 MG/DL (ref 0–100)
LDLC/HDLC SERPL: 0.75 {RATIO}
POTASSIUM SERPL-SCNC: 4.3 MMOL/L (ref 3.5–5.2)
PROT SERPL-MCNC: 6.8 G/DL (ref 6–8.5)
SODIUM SERPL-SCNC: 140 MMOL/L (ref 136–145)
TRIGL SERPL-MCNC: 85 MG/DL (ref 0–150)
VLDLC SERPL-MCNC: 16 MG/DL (ref 5–40)

## 2021-02-12 PROCEDURE — 99214 OFFICE O/P EST MOD 30 MIN: CPT | Performed by: FAMILY MEDICINE

## 2021-02-12 PROCEDURE — 83036 HEMOGLOBIN GLYCOSYLATED A1C: CPT | Performed by: FAMILY MEDICINE

## 2021-02-12 PROCEDURE — 80053 COMPREHEN METABOLIC PANEL: CPT | Performed by: FAMILY MEDICINE

## 2021-02-12 PROCEDURE — 36415 COLL VENOUS BLD VENIPUNCTURE: CPT | Performed by: FAMILY MEDICINE

## 2021-02-12 PROCEDURE — 80061 LIPID PANEL: CPT | Performed by: FAMILY MEDICINE

## 2021-02-12 NOTE — PROGRESS NOTES
Chief Complaint  Hypertension (6 mos f/u)    Subjective          Reyna Castano presents to [facility]  /67 at 5 am today.    Hypertension  This is a chronic problem. The current episode started more than 1 year ago. The problem has been waxing and waning since onset. Pertinent negatives include no anxiety, chest pain, malaise/fatigue, palpitations, peripheral edema or shortness of breath. There are no associated agents to hypertension.   Diabetes  Pertinent negatives for diabetes include no chest pain. There is no change in her home blood glucose trend. Her breakfast blood glucose range is generally  mg/dl.       Review of Systems   Constitutional: Negative for malaise/fatigue.   Respiratory: Negative for shortness of breath.    Cardiovascular: Negative for chest pain and palpitations.     Objective   Vital Signs:   BP (!) 192/112 (BP Location: Left arm, Patient Position: Sitting, Cuff Size: Adult)   Pulse 80   Temp 97.1 °F (36.2 °C)   Wt 109 kg (240 lb)   SpO2 96%   BMI 37.59 kg/m²     Physical Exam  Vitals signs and nursing note reviewed.   Constitutional:       General: She is not in acute distress.     Appearance: She is well-developed.   HENT:      Head: Normocephalic.   Eyes:      General: Lids are normal.      Conjunctiva/sclera: Conjunctivae normal.   Neck:      Musculoskeletal: Normal range of motion.      Thyroid: No thyroid mass or thyromegaly.      Trachea: Trachea normal.   Cardiovascular:      Rate and Rhythm: Normal rate and regular rhythm.      Heart sounds: Normal heart sounds.   Pulmonary:      Effort: Pulmonary effort is normal.      Breath sounds: Normal breath sounds.   Abdominal:      Palpations: Abdomen is soft.   Lymphadenopathy:      Cervical: No cervical adenopathy.   Skin:     General: Skin is warm and dry.   Neurological:      Mental Status: She is alert and oriented to person, place, and time.   Psychiatric:         Attention and  Perception: She is attentive.         Mood and Affect: Mood normal.         Speech: Speech normal.         Behavior: Behavior normal.        Result Review :                 Assessment and Plan    Diagnoses and all orders for this visit:    1. Essential hypertension (Primary)  Assessment & Plan:  Hypertension is worsening.  Dietary sodium restriction.  Weight loss.  Regular aerobic exercise.  Continue current medications.  Blood pressure will be reassessed at the next regular appointment.    Orders:  -     Comprehensive Metabolic Panel  -     Lipid Panel  -     Hemoglobin A1c    2. 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   Continue current treatment regimen.  Diabetes will be reassessed in 6 months.    Orders:  -     Comprehensive Metabolic Panel  -     Lipid Panel  -     Hemoglobin A1c    3. Morbidly obese (CMS/[facility])  Assessment & Plan:  Patient's (Body mass index is 37.59 kg/m².) indicates that they are obese (BMI >30) with obesity-related health conditions that include hypertension and diabetes mellitus . Obesity is unchanged. BMI is is above average; BMI management plan is completed. We discussed portion control and increasing exercise.         Follow Up   No follow-ups on file.  Patient was given instructions and counseling regarding her condition or for health maintenance advice. Please see specific information pulled into the AVS if appropriate.

## 2021-02-21 NOTE — ASSESSMENT & PLAN NOTE
Patient's (Body mass index is 37.59 kg/m².) indicates that they are obese (BMI >30) with obesity-related health conditions that include hypertension and diabetes mellitus . Obesity is unchanged. BMI is is above average; BMI management plan is completed. We discussed portion control and increasing exercise.

## 2021-02-21 NOTE — ASSESSMENT & PLAN NOTE
Hypertension is worsening.  Dietary sodium restriction.  Weight loss.  Regular aerobic exercise.  Continue current medications.  Blood pressure will be reassessed at the next regular appointment.

## 2021-03-10 RX ORDER — LANCETS
EACH MISCELLANEOUS
Qty: 100 EACH | Refills: 1 | Status: SHIPPED | OUTPATIENT
Start: 2021-03-10 | End: 2021-08-16

## 2021-06-28 RX ORDER — BLOOD SUGAR DIAGNOSTIC
STRIP MISCELLANEOUS
Qty: 100 EACH | Refills: 1 | Status: SHIPPED | OUTPATIENT
Start: 2021-06-28 | End: 2021-06-28 | Stop reason: SDUPTHER

## 2021-06-28 NOTE — TELEPHONE ENCOUNTER
Caller: Reyna Castano    Relationship: Self    Best call back number: [phone]    Medication needed:   Requested Prescriptions     Pending Prescriptions Disp Refills   • glucose blood (Accu-Chek Salud Plus) test strip 100 each 1     Sig: Use as instructed       When do you need the refill by: 6/30/2021    What additional details did the patient provide when requesting the medication: PATIENT STATES [facility] GAVE HER THIS NUMBER TO HAVE THE OFFICE CALL: [phone]    Does the patient have less than a 3 day supply:  [] Yes  [x] No    What is the patient's preferred pharmacy: [facility] - [facility] [address] - [phone]  - [phone]

## 2021-06-29 RX ORDER — BLOOD SUGAR DIAGNOSTIC
STRIP MISCELLANEOUS
Qty: 100 EACH | Refills: 1 | Status: SHIPPED | OUTPATIENT
Start: 2021-06-29 | End: 2022-03-18 | Stop reason: SDUPTHER

## 2021-08-16 RX ORDER — LANCETS
EACH MISCELLANEOUS
Qty: 100 EACH | Refills: 1 | Status: SHIPPED | OUTPATIENT
Start: 2021-08-16 | End: 2022-01-10

## 2021-09-03 ENCOUNTER — OFFICE VISIT (OUTPATIENT)
Dept: FAMILY MEDICINE CLINIC | Facility: CLINIC | Age: 71
End: 2021-09-03

## 2021-09-03 ENCOUNTER — LAB (OUTPATIENT)
Dept: FAMILY MEDICINE CLINIC | Facility: CLINIC | Age: 71
End: 2021-09-03

## 2021-09-03 VITALS
TEMPERATURE: 96.9 F | HEART RATE: 73 BPM | DIASTOLIC BLOOD PRESSURE: 82 MMHG | WEIGHT: 237 LBS | OXYGEN SATURATION: 94 % | BODY MASS INDEX: 37.12 KG/M2 | SYSTOLIC BLOOD PRESSURE: 180 MMHG

## 2021-09-03 DIAGNOSIS — Z00.00 WELLNESS EXAMINATION: Primary | ICD-10-CM

## 2021-09-03 DIAGNOSIS — I10 ESSENTIAL HYPERTENSION: ICD-10-CM

## 2021-09-03 LAB
ALBUMIN SERPL-MCNC: 4.2 G/DL (ref 3.5–5.2)
ALBUMIN/GLOB SERPL: 1.5 G/DL
ALP SERPL-CCNC: 93 U/L (ref 39–117)
ALT SERPL W P-5'-P-CCNC: 12 U/L (ref 1–33)
ANION GAP SERPL CALCULATED.3IONS-SCNC: 10.1 MMOL/L (ref 5–15)
AST SERPL-CCNC: 16 U/L (ref 1–32)
BILIRUB SERPL-MCNC: 0.4 MG/DL (ref 0–1.2)
BUN SERPL-MCNC: 16 MG/DL (ref 8–23)
BUN/CREAT SERPL: 19.3 (ref 7–25)
CALCIUM SPEC-SCNC: 9.3 MG/DL (ref 8.6–10.5)
CHLORIDE SERPL-SCNC: 100 MMOL/L (ref 98–107)
CHOLEST SERPL-MCNC: 129 MG/DL (ref 0–200)
CO2 SERPL-SCNC: 26.9 MMOL/L (ref 22–29)
CREAT SERPL-MCNC: 0.83 MG/DL (ref 0.57–1)
GFR SERPL CREATININE-BSD FRML MDRD: 68 ML/MIN/1.73
GLOBULIN UR ELPH-MCNC: 2.8 GM/DL
GLUCOSE SERPL-MCNC: 143 MG/DL (ref 65–99)
HDLC SERPL-MCNC: 69 MG/DL (ref 40–60)
LDLC SERPL CALC-MCNC: 43 MG/DL (ref 0–100)
LDLC/HDLC SERPL: 0.61 {RATIO}
POTASSIUM SERPL-SCNC: 4.5 MMOL/L (ref 3.5–5.2)
PROT SERPL-MCNC: 7 G/DL (ref 6–8.5)
SODIUM SERPL-SCNC: 137 MMOL/L (ref 136–145)
TRIGL SERPL-MCNC: 90 MG/DL (ref 0–150)
VLDLC SERPL-MCNC: 17 MG/DL (ref 5–40)

## 2021-09-03 PROCEDURE — 83036 HEMOGLOBIN GLYCOSYLATED A1C: CPT | Performed by: FAMILY MEDICINE

## 2021-09-03 PROCEDURE — 80061 LIPID PANEL: CPT | Performed by: FAMILY MEDICINE

## 2021-09-03 PROCEDURE — 80053 COMPREHEN METABOLIC PANEL: CPT | Performed by: FAMILY MEDICINE

## 2021-09-03 PROCEDURE — 99397 PER PM REEVAL EST PAT 65+ YR: CPT | Performed by: FAMILY MEDICINE

## 2021-09-03 PROCEDURE — 36415 COLL VENOUS BLD VENIPUNCTURE: CPT | Performed by: FAMILY MEDICINE

## 2021-09-03 RX ORDER — LINAGLIPTIN 5 MG/1
TABLET, FILM COATED ORAL
Qty: 90 TABLET | Refills: 3 | Status: SHIPPED | OUTPATIENT
Start: 2021-09-03 | End: 2022-06-26

## 2021-09-03 RX ORDER — ATORVASTATIN CALCIUM 10 MG/1
TABLET, FILM COATED ORAL
Qty: 90 TABLET | Refills: 3 | Status: SHIPPED | OUTPATIENT
Start: 2021-09-03 | End: 2022-06-26

## 2021-09-03 RX ORDER — CARVEDILOL 25 MG/1
25 TABLET ORAL 2 TIMES DAILY WITH MEALS
Qty: 180 TABLET | Refills: 1 | Status: SHIPPED | OUTPATIENT
Start: 2021-09-03 | End: 2021-10-06 | Stop reason: SDUPTHER

## 2021-09-06 LAB — HBA1C MFR BLD: 6.6 % (ref 3.5–5.6)

## 2021-10-06 RX ORDER — CARVEDILOL 12.5 MG/1
TABLET ORAL
Qty: 180 TABLET | OUTPATIENT
Start: 2021-10-06

## 2021-10-06 RX ORDER — CARVEDILOL 25 MG/1
25 TABLET ORAL 2 TIMES DAILY WITH MEALS
Qty: 180 TABLET | Refills: 1 | Status: SHIPPED | OUTPATIENT
Start: 2021-10-06 | End: 2022-03-04 | Stop reason: SDUPTHER

## 2021-10-06 NOTE — TELEPHONE ENCOUNTER
Caller: Reyna Castano    Relationship: Self    Medication requested (name and dosage): carvedilol (Coreg) 25 MG tablet    Pharmacy where request should be sent: [facility] - [facility] [address] - [phone]  - [phone]   [phone]    Additional details provided by patient: PATIENT STATES PHARMACY STILL HAS OLD PRESCRIPTION OF 12.5MG AND IS NEEDING PRESCRIPTIONS FOR THE NEW DOSAGE OF 25MG    Best call back number: [phone]    Does the patient have less than a 3 day supply:  [] Yes  [x] No    Sheila Teran Rep   10/06/21 11:44 EDT

## 2021-10-17 NOTE — PROGRESS NOTES
Subjective   Reyna Csatano is a 71 y.o. female and is here for a comprehensive physical exam. The patient reports no problems.She is fasting today for labs.  She declines mammogram.She has had increased stress because her sister recently , possibly due to COVID.    Do you take any herbs or supplements that were not prescribed by a doctor? no  Are you taking calcium supplements? no  Are you taking aspirin daily? no    The following portions of the patient's history were reviewed and updated as appropriate: allergies, current medications, past family history, past medical history, past social history, past surgical history and problem list.    Review of Systems  Do you have pain that bothers you in your daily life? not asked  Pertinent items are noted in HPI.    Objective   /82 (BP Location: Left arm, Patient Position: Sitting, Cuff Size: Adult)   Pulse 73   Temp 96.9 °F (36.1 °C) (Infrared)   Wt 108 kg (237 lb)   SpO2 94%   BMI 37.12 kg/m²   General appearance: alert, appears stated age and cooperative  Head: Normocephalic, without obvious abnormality, atraumatic  Eyes: conjunctivae/corneas clear. PERRL, EOM's intact. Fundi benign.  Neck: no adenopathy, supple, symmetrical, trachea midline and thyroid not enlarged, symmetric, no tenderness/mass/nodules  Lungs: clear to auscultation bilaterally  Heart: regular rate and rhythm, S1, S2 normal, no murmur, click, rub or gallop  Extremities: extremities normal, atraumatic, no cyanosis or edema  Skin: Skin color, texture, turgor normal. No rashes or lesions  Lymph nodes: Cervical, supraclavicular, and axillary nodes normal.  Neurologic: Grossly normal     No visits with results within 1 Week(s) from this visit.   Latest known visit with results is:   Office Visit on 2021   Component Date Value Ref Range Status   • Glucose 2021 142* 65 - 99 mg/dL Final   • BUN 2021 11  8 - 23 mg/dL Final   • Creatinine 2021 0.77  0.57 - 1.00 mg/dL Final    • Sodium 02/12/2021 140  136 - 145 mmol/L Final   • Potassium 02/12/2021 4.3  3.5 - 5.2 mmol/L Final   • Chloride 02/12/2021 103  98 - 107 mmol/L Final   • CO2 02/12/2021 26.0  22.0 - 29.0 mmol/L Final   • Calcium 02/12/2021 9.5  8.6 - 10.5 mg/dL Final   • Total Protein 02/12/2021 6.8  6.0 - 8.5 g/dL Final   • Albumin 02/12/2021 4.00  3.50 - 5.20 g/dL Final   • ALT (SGPT) 02/12/2021 11  1 - 33 U/L Final   • AST (SGOT) 02/12/2021 13  1 - 32 U/L Final   • Alkaline Phosphatase 02/12/2021 95  39 - 117 U/L Final   • Total Bilirubin 02/12/2021 0.5  0.0 - 1.2 mg/dL Final   • eGFR Non African Amer 02/12/2021 74  >60 mL/min/1.73 Final   • Globulin 02/12/2021 2.8  gm/dL Final   • A/G Ratio 02/12/2021 1.4  g/dL Final   • BUN/Creatinine Ratio 02/12/2021 14.3  7.0 - 25.0 Final   • Anion Gap 02/12/2021 11.0  5.0 - 15.0 mmol/L Final   • Total Cholesterol 02/12/2021 136  0 - 200 mg/dL Final   • Triglycerides 02/12/2021 85  0 - 150 mg/dL Final   • HDL Cholesterol 02/12/2021 68* 40 - 60 mg/dL Final   • LDL Cholesterol  02/12/2021 52  0 - 100 mg/dL Final   • VLDL Cholesterol 02/12/2021 16  5 - 40 mg/dL Final   • LDL/HDL Ratio 02/12/2021 0.75   Final   • Hemoglobin A1C 02/12/2021 6.7* 3.5 - 5.6 % Final       Assessment/Plan   Healthy female exam.   Diagnoses and all orders for this visit:    1. Wellness examination (Primary)  -     Comprehensive Metabolic Panel  -     Lipid Panel  -     Hemoglobin A1c    2. Essential hypertension  Assessment & Plan:  Hypertension is worsening.  Dietary sodium restriction.  Weight loss.  Regular aerobic exercise.  Medication changes per orders.  Blood pressure will be reassessed at the next regular appointment.      Other orders  -     carvedilol (Coreg) 25 MG tablet; Take 1 tablet by mouth 2 (Two) Times a Day With Meals.  Dispense: 180 tablet; Refill: 1    1. Well exam  2. Patient Counseling:  --Nutrition: Stressed importance of moderation in sodium/caffeine intake, saturated fat and cholesterol,  caloric balance, sufficient intake of fresh fruits, vegetables, fiber, calcium, iron  --Exercise: Stressed the importance of regular exercise.   --Dental health: Discussed importance of regular tooth brushing, flossing, and dental visits.  --Immunizations reviewed.  --Discussed benefits of screening colonoscopy.    3. Discussed the patient's BMI with her.  The BMI is above average; BMI management plan is completed  4. Follow up in one year            no concerns

## 2022-01-10 RX ORDER — LANCETS
EACH MISCELLANEOUS
Qty: 100 EACH | Refills: 1 | Status: SHIPPED | OUTPATIENT
Start: 2022-01-10 | End: 2022-06-08

## 2022-03-04 ENCOUNTER — OFFICE VISIT (OUTPATIENT)
Dept: FAMILY MEDICINE CLINIC | Facility: CLINIC | Age: 72
End: 2022-03-04

## 2022-03-04 ENCOUNTER — LAB (OUTPATIENT)
Dept: FAMILY MEDICINE CLINIC | Facility: CLINIC | Age: 72
End: 2022-03-04

## 2022-03-04 VITALS
WEIGHT: 238 LBS | TEMPERATURE: 97.5 F | SYSTOLIC BLOOD PRESSURE: 184 MMHG | DIASTOLIC BLOOD PRESSURE: 64 MMHG | HEART RATE: 66 BPM | BODY MASS INDEX: 37.28 KG/M2 | OXYGEN SATURATION: 94 %

## 2022-03-04 DIAGNOSIS — I10 PRIMARY HYPERTENSION: ICD-10-CM

## 2022-03-04 DIAGNOSIS — E11.8 DISORDER ASSOCIATED WITH TYPE 2 DIABETES MELLITUS: Primary | ICD-10-CM

## 2022-03-04 LAB
ALBUMIN SERPL-MCNC: 4.1 G/DL (ref 3.5–5.2)
ALBUMIN/GLOB SERPL: 1.5 G/DL
ALP SERPL-CCNC: 97 U/L (ref 39–117)
ALT SERPL W P-5'-P-CCNC: 9 U/L (ref 1–33)
ANION GAP SERPL CALCULATED.3IONS-SCNC: 11 MMOL/L (ref 5–15)
AST SERPL-CCNC: 12 U/L (ref 1–32)
BILIRUB SERPL-MCNC: 0.5 MG/DL (ref 0–1.2)
BUN SERPL-MCNC: 13 MG/DL (ref 8–23)
BUN/CREAT SERPL: 15.7 (ref 7–25)
CALCIUM SPEC-SCNC: 9.7 MG/DL (ref 8.6–10.5)
CHLORIDE SERPL-SCNC: 104 MMOL/L (ref 98–107)
CHOLEST SERPL-MCNC: 126 MG/DL (ref 0–200)
CO2 SERPL-SCNC: 26 MMOL/L (ref 22–29)
CREAT SERPL-MCNC: 0.83 MG/DL (ref 0.57–1)
EGFRCR SERPLBLD CKD-EPI 2021: 75 ML/MIN/1.73
GLOBULIN UR ELPH-MCNC: 2.7 GM/DL
GLUCOSE SERPL-MCNC: 153 MG/DL (ref 65–99)
HBA1C MFR BLD: 6.4 % (ref 3.5–5.6)
HDLC SERPL-MCNC: 63 MG/DL (ref 40–60)
LDLC SERPL CALC-MCNC: 49 MG/DL (ref 0–100)
LDLC/HDLC SERPL: 0.79 {RATIO}
POTASSIUM SERPL-SCNC: 4.4 MMOL/L (ref 3.5–5.2)
PROT SERPL-MCNC: 6.8 G/DL (ref 6–8.5)
SODIUM SERPL-SCNC: 141 MMOL/L (ref 136–145)
TRIGL SERPL-MCNC: 66 MG/DL (ref 0–150)
VLDLC SERPL-MCNC: 14 MG/DL (ref 5–40)

## 2022-03-04 PROCEDURE — 99214 OFFICE O/P EST MOD 30 MIN: CPT | Performed by: FAMILY MEDICINE

## 2022-03-04 PROCEDURE — 80061 LIPID PANEL: CPT | Performed by: FAMILY MEDICINE

## 2022-03-04 PROCEDURE — 83036 HEMOGLOBIN GLYCOSYLATED A1C: CPT | Performed by: FAMILY MEDICINE

## 2022-03-04 PROCEDURE — 36415 COLL VENOUS BLD VENIPUNCTURE: CPT | Performed by: FAMILY MEDICINE

## 2022-03-04 PROCEDURE — 80053 COMPREHEN METABOLIC PANEL: CPT | Performed by: FAMILY MEDICINE

## 2022-03-04 RX ORDER — CARVEDILOL 25 MG/1
25 TABLET ORAL 2 TIMES DAILY WITH MEALS
Qty: 180 TABLET | Refills: 1
Start: 2022-03-04 | End: 2022-03-28

## 2022-03-18 RX ORDER — BLOOD SUGAR DIAGNOSTIC
STRIP MISCELLANEOUS
Qty: 100 EACH | Refills: 1 | Status: SHIPPED | OUTPATIENT
Start: 2022-03-18 | End: 2022-04-06

## 2022-03-28 RX ORDER — CARVEDILOL 25 MG/1
TABLET ORAL
Qty: 180 TABLET | Refills: 1 | Status: SHIPPED | OUTPATIENT
Start: 2022-03-28 | End: 2022-08-24

## 2022-04-06 RX ORDER — BLOOD SUGAR DIAGNOSTIC
STRIP MISCELLANEOUS
Qty: 100 EACH | Refills: 1 | Status: SHIPPED | OUTPATIENT
Start: 2022-04-06 | End: 2022-10-29

## 2022-04-11 ENCOUNTER — TELEPHONE (OUTPATIENT)
Dept: FAMILY MEDICINE CLINIC | Facility: CLINIC | Age: 72
End: 2022-04-11

## 2022-04-11 RX ORDER — BLOOD-GLUCOSE METER
EACH MISCELLANEOUS
Qty: 1 KIT | Refills: 0 | Status: SHIPPED | OUTPATIENT
Start: 2022-04-11

## 2022-04-11 NOTE — TELEPHONE ENCOUNTER
Pt called and said she needs a new glucometer, hers is broken.  She says Naresh is supposed to have sent a request several times.  I don't see any request in the chart.  She would like to know if you are able to just send a script to the pharmacy for her to get a new glucometer.  She has been without since March 16, and the one she has borrowed she needs to return.       Thanks

## 2022-04-11 NOTE — TELEPHONE ENCOUNTER
I sent in a prescription for a new glucometer to [facility].  Does she need testing supplies (test strips and lancets) also?

## 2022-04-19 NOTE — DISCHARGE INSTRUCTIONS
Continue ibuprofen and heating pad.  Norco will make you sleepy and constipated increase fluids and fiber.  Follow-up with your primary care doctor on Monday.  Return for fever chills redness swelling rash weakness to extremity loss of bladder bowel control or any other new or worse problems or concerns.   Internal Medicine Progress Note          History Of Present Illness  Lizzie Bolaños is a 66 year old female presenting with seizure.     66 yr old w stage 4 colon ca w mets to liver; lung brain presents w focal lue seizure.  Keppra increased. Feels better but not strong enough to go home.  Seen earlier; also has had fever. .    Presented 2/3 w status epilepticus; found to have new brain mets. Had SRS to brain mets and xrt to spine mets. Neurology note appreciated.     2/29 seen w son and RN; fever again;  No obvious source of infection; denies cough; dysuria etc.  Up in chair; feels ok.  Doesn't feel ready to go home yet though.     3/1 seen w son; fever again early this am and now; appears tired; was well prior to fever; ate well; up in room.  No focal source infection.     3/2 seen w sons; communicated w Dr Colon; fever again 0400; looks better today; up in chair; weaker l side of body today; feels thrush coming back; points to neck    3/3 seen w sons; spoke w RN's; chills; feels better though; up in chair; above appreciated    3/4; seen earlier; up in chair; feels good; better strength; no fever; 2 days; thrush ok; walking    Past Medical History  Past Medical History:   Diagnosis Date   • Arthritis    • Colon cancer (CMS/HCC)         Surgical History  Past Surgical History:   Procedure Laterality Date   • Appendectomy     • Cholecystectomy     • Colon resection with small bowel resection and single anastomosis  08/2016   • Hernia repair  2017   • Hernia repair  2017   • Hysterectomy               Allergies  ALLERGIES:  Lemon   (food or med); Lime   (food); Bactrim ds; Loracarbef; Oxaliplatin; Pineapple   (food or med); Seasonal; and Sulfa antibiotics    Medications  Current Facility-Administered Medications   Medication Dose Route Frequency Provider Last Rate Last Dose   • bacitracin-polymyxin B (POLYSPORIN) [id number] UNIT/GM ointment   Topical PRN Michael Colon MD       • dexamethasone (DECADRON) tablet 4 mg  4  mg Oral 2 times per day Michael Colon MD   4 mg at 03/04/20 0912   • oxyCODONE (IMM REL) (ROXICODONE) tablet 10 mg  10 mg Oral 6 times per day Omar Rico MD   10 mg at 03/04/20 1408   • fluconazole (DIFLUCAN) tablet 100 mg  100 mg Oral Daily Omar Rico MD   100 mg at 03/04/20 0912   • desmopressin (DDAVP) tablet 0.05 mg  0.05 mg Oral Q12H Anthony Huggins MD   0.05 mg at 03/04/20 0910   • iohexol (OMNIPAQUE 300) contrast solution 50 mL  50 mL Oral Once Michael Colon MD       • sodium chloride 0.9 % flush bag 25 mL  25 mL Intravenous PRN Omar Rico MD       • potassium CHLORIDE (KLOR-CON M) yojana ER tablet 20 mEq  20 mEq Oral Q4H PRN Omar Rico MD   20 mEq at 03/02/20 0629   • potassium CHLORIDE (KLOR-CON) packet 20 mEq  20 mEq Per NG tube Q4H PRN Omar Rico MD       • potassium CHLORIDE 20 mEq/100mL IVPB premix  20 mEq Intravenous Q4H PRN Omar Rico MD       • potassium CHLORIDE (KLOR-CON M) yojana ER tablet 40 mEq  40 mEq Oral Q4H PRN Omar Rico MD   40 mEq at 02/29/20 1517   • potassium CHLORIDE (KLOR-CON) packet 40 mEq  40 mEq Per NG tube Q4H PRN Omar Rico MD       • potassium CHLORIDE 40 mEq in sodium chloride 0.9 % 250 mL IVPB  40 mEq Intravenous Q4H PRN Omar Rico MD       • magnesium sulfate 1 g in dextrose 5% 100 mL IVPB premix  1 g Intravenous Daily PRN Omar Rico MD       • magnesium sulfate 2 g in 50 mL premix IVPB  2 g Intravenous Daily PRN Omar Rico MD       • magnesium sulfate 2 g in 50 mL premix IVPB  2 g Intravenous Q4H PRN Omar Rico MD       • ondansetron (ZOFRAN) injection 4 mg  4 mg Intravenous BID PRN Omar Rico MD       • acetaminophen (TYLENOL) tablet 650 mg  650 mg Oral Q4H PRN Omar Rico MD   650 mg at 03/02/20 0435   • polyethylene glycol (MIRALAX) packet 17 g  17 g Oral Daily PRN Omar Rico MD       • ibuprofen (MOTRIN) tablet 400 mg  400 mg Oral Q6H PRN Omar Rico MD   400 mg at 03/01/20 1617   • piperacillin-tazobactam  (ZOSYN) 3.375 g in sodium chloride 0.9 % 100 mL IVPB  3.375 g Intravenous 3 times per day Omar Rico MD 25 mL/hr at 03/04/20 1704 3.375 g at 03/04/20 1704   • levETIRAcetam (KepPRA) tablet 1,000 mg  1,000 mg Oral 2 times per day Chris Saha, DO   1,000 mg at 03/04/20 0908   • docusate sodium-sennosides (SENOKOT S) 50-8.6 MG 1 tablet  1 tablet Oral Daily PRN Chris Saha, DO       • mirtazapine (REMERON) tablet 15 mg  15 mg Oral Nightly Chris Saha DO   15 mg at 03/03/20 2150   • pantoprazole (PROTONIX) EC tablet 40 mg  40 mg Oral QAM AC Chris Saha DO   40 mg at 03/04/20 0605   • sodium chloride 0.9 % flush bag 25 mL  25 mL Intravenous PRN Chris Saha, DO 25 mL/hr at 03/04/20 1700     • sodium chloride (PF) 0.9 % injection 2 mL  2 mL Intracatheter 2 times per day Chris Saha, DO   2 mL at 03/03/20 2100   • enoxaparin (LOVENOX) injection 40 mg  40 mg Subcutaneous Daily Chris Saha DO   40 mg at 03/04/20 0915       Prior to Admission medications    Medication Sig Start Date End Date Taking? Authorizing Provider   desmopressin (DDAVP) 0.1 MG tablet 0.05 mg 2 times daily.   Yes Outside Provider   mirtazapine (REMERON) 15 MG tablet Take by mouth nightly.    Yes Outside Provider   levETIRAcetam (KEPPRA) 500 MG tablet Take 2 tablets by mouth every 12 hours. 2/28/20   Anthony Huggins MD   docusate sodium-sennosides (SENOKOT S) 50-8.6 MG per tablet Take 1 tablet by mouth daily as needed for Constipation.    Outside Provider   oxyCODONE, IMM REL, (ROXICODONE) 5 MG immediate release tablet Take 10 mg by mouth every 6 hours as needed for Pain.    Outside Provider   levETIRAcetam (KEPPRA) 500 MG tablet Take 500 mg by mouth 2 times daily.  2/28/20  Outside Provider   omeprazole (PRILOSEC) 20 MG capsule Take 40 mg by mouth daily as needed.     Outside Provider       Review of Systems  Review of Systems   Constitutional: Negative.    HENT: Negative.    Eyes: Negative.    Respiratory:  Negative.    Cardiovascular: Negative.    Gastrointestinal: Negative.    Endocrine: Negative.    Genitourinary: Negative.    Musculoskeletal: Negative.    Skin: Negative.    Allergic/Immunologic: Negative.    Neurological: Negative.    Hematological: Negative.    Psychiatric/Behavioral: Negative.    All other systems reviewed and are negative.        Physical Exam  Physical Exam  Vitals signs and nursing note reviewed.   Constitutional:       Appearance: She is well-developed.   HENT:      Head: Normocephalic and atraumatic.      Right Ear: External ear normal.      Left Ear: External ear normal.      Nose: Nose normal.   Eyes:      General: No scleral icterus.        Right eye: No discharge.         Left eye: No discharge.      Conjunctiva/sclera: Conjunctivae normal.      Pupils: Pupils are equal, round, and reactive to light.   Neck:      Musculoskeletal: Normal range of motion and neck supple.      Thyroid: No thyromegaly.      Vascular: No JVD.      Trachea: No tracheal deviation.   Cardiovascular:      Rate and Rhythm: Normal rate and regular rhythm.      Heart sounds: Normal heart sounds. No murmur. No friction rub. No gallop.    Pulmonary:      Effort: Pulmonary effort is normal.      Breath sounds: No stridor. No wheezing or rales.      Comments: Few crackles bases  Chest:      Chest wall: No tenderness.   Abdominal:      General: Bowel sounds are normal. There is no distension.      Palpations: Abdomen is soft. There is no mass.      Tenderness: There is no tenderness. There is no guarding or rebound.   Musculoskeletal: Normal range of motion.         General: No tenderness or deformity.   Lymphadenopathy:      Cervical: No cervical adenopathy.   Skin:     General: Skin is warm and dry.      Coloration: Skin is not pale.      Findings: No erythema or rash.   Neurological:      Mental Status: She is alert and oriented to person, place, and time.      Cranial Nerves: No cranial nerve deficit.      Motor: No  abnormal muscle tone.      Coordination: Coordination normal.   Psychiatric:         Behavior: Behavior normal.         Thought Content: Thought content normal.         Judgment: Judgment normal.          Last Recorded Vitals  Blood pressure (!) 139/91, pulse 74, temperature 97.3 °F (36.3 °C), resp. rate 16, height 5' 3\" (1.6 m), weight 84.6 kg (186 lb 8.2 oz), SpO2 94 %.  Body mass index is 33.04 kg/m².     Relevant Results    Imaging    CT CHEST W CONTRAST   Final Result   1.  CT of the chest demonstrates progression of metastatic disease with cannonball lung nodules/masses, progression of hilar and mediastinal lymphadenopathy, and new small right layering pleural effusion.   2.  New groundglass opacities with interlobular septal line thickening predominantly affecting the upper lobes can be seen with edema, drug reaction/toxicity, malignancy, pulmonary alveolar proteinosis, among other etiologies.   3.  CT of the abdomen/pelvis demonstrates progression of metastatic disease, with new bony metastases and progression of hepatic metastases as described in the body of the report.   4.  Abdominal lymphadenopathy and abdominal wall masses, with intra-abdominal spiculated mass likely tethering the left ureter with resultant left hydroureteronephrosis, progressed since the prior CT.   5.  Masslike thickening of the rectosigmoid junction highly concerning for malignancy.   6.  Please see above for additional observations.         Electronically Signed by: JAIME WILLIAMSON M.D.    Signed on: 3/2/2020 11:17 PM          CT ABDOMEN PELVIS W CONTRAST   Final Result   1.  CT of the chest demonstrates progression of metastatic disease with cannonball lung nodules/masses, progression of hilar and mediastinal lymphadenopathy, and new small right layering pleural effusion.   2.  New groundglass opacities with interlobular septal line thickening predominantly affecting the upper lobes can be seen with edema, drug reaction/toxicity,  malignancy, pulmonary alveolar proteinosis, among other etiologies.   3.  CT of the abdomen/pelvis demonstrates progression of metastatic disease, with new bony metastases and progression of hepatic metastases as described in the body of the report.   4.  Abdominal lymphadenopathy and abdominal wall masses, with intra-abdominal spiculated mass likely tethering the left ureter with resultant left hydroureteronephrosis, progressed since the prior CT.   5.  Masslike thickening of the rectosigmoid junction highly concerning for malignancy.   6.  Please see above for additional observations.         Electronically Signed by: JAIME WILLIAMSON M.D.    Signed on: 3/2/2020 11:17 PM          CT HEAD WO CONTRAST   Final Result   As above.            Electronically Signed by: NOLA RAGSDALE D.O.    Signed on: 3/2/2020 5:12 PM          CT HEAD WO CONTRAST   Final Result   As above.            Electronically Signed by: NOLA RAGSDALE D.O.    Signed on: 2/27/2020 5:10 PM          XR CHEST PA OR AP 1 VIEW   Final Result      1.  Bilateral pulmonary nodules and masses, suspicious for metastatic disease.  At least some nodules are larger compared to prior examination.        2.  Low lung volumes with mild atelectasis or infiltrate at the right lung base.        3.  No pneumothorax is identified.         Electronically Signed by: ELIZABETH AMANDA M.D.    Signed on: 2/27/2020 4:30 PM               Labs   No results displayed because visit has over 200 results.          Assessment & Plan:  1. Symptomatic epilepsy w simple partial seizures 2/2 cerebral mets.  Keppra increased.  2. Stage 4 colon ca w cerebral mets sp xrt; decadron 4 bid; [facility] in 2 weeks  3. Fever; ?aspiration;?seizures?brain mets; empiric zosyn; blood and urine cx ngtd; check echo; stool if diarrhea( chronic ibs though); feels better since decadron restarted  4. diabetes insipidus; desmopressin  5. Prophylaxis; lmwh  6. Hypokalemia  replace  7. Pancytopenia;?infection?keppra  8. Severe protein jonny malnutrition  9. ?thrush; fluconazole; better      Patient Active Problem List   Diagnosis   • ACP (advance care planning)   • Metastatic colon cancer in female (CMS/HCC)   • Neoplasm related pain   • Malignant neoplasm of cecum (CMS/HCC)   • Seizure (CMS/HCC)   • Diabetes insipidus (CMS/HCC)         Code Status    Code Status: Prior      Omar Rico MD        4 = No assist / stand by assistance

## 2022-06-01 ENCOUNTER — OFFICE VISIT (OUTPATIENT)
Dept: FAMILY MEDICINE CLINIC | Facility: CLINIC | Age: 72
End: 2022-06-01

## 2022-06-01 VITALS
TEMPERATURE: 99.8 F | DIASTOLIC BLOOD PRESSURE: 114 MMHG | OXYGEN SATURATION: 96 % | WEIGHT: 225 LBS | HEART RATE: 83 BPM | BODY MASS INDEX: 35.24 KG/M2 | SYSTOLIC BLOOD PRESSURE: 183 MMHG

## 2022-06-01 DIAGNOSIS — I10 PRIMARY HYPERTENSION: ICD-10-CM

## 2022-06-01 DIAGNOSIS — U07.1 COVID-19 VIRUS INFECTION: Primary | ICD-10-CM

## 2022-06-01 LAB
EXPIRATION DATE: ABNORMAL
FLUAV AG UPPER RESP QL IA.RAPID: NOT DETECTED
FLUBV AG UPPER RESP QL IA.RAPID: NOT DETECTED
INTERNAL CONTROL: ABNORMAL
Lab: ABNORMAL
SARS-COV-2 AG UPPER RESP QL IA.RAPID: DETECTED

## 2022-06-01 PROCEDURE — 99213 OFFICE O/P EST LOW 20 MIN: CPT | Performed by: FAMILY MEDICINE

## 2022-06-01 PROCEDURE — 87428 SARSCOV & INF VIR A&B AG IA: CPT | Performed by: FAMILY MEDICINE

## 2022-06-01 NOTE — PROGRESS NOTES
"Chief Complaint  Fatigue (Body chills, dry cough on going for 3 days )    Subjective        Reyna Castano presents to [facility]  She feels like she has had the flu for 1-2 months.  Head congestion, fatigued for a while and took an antibiotic and felt better for a while.  Then symptoms returned earlier this week.  She was around some relatives who were \"snotty\".  She has had a \"choking cough\", low grade fever, fatigue. No loss of taste or smell.  No diarrhea.    Fatigue  This is a new problem. Associated symptoms include chills, congestion, coughing, fatigue, a fever and a sore throat. Pertinent negatives include no anorexia, chest pain or swollen glands.       Objective   Vital Signs:  BP (!) 183/114 (BP Location: Left arm, Patient Position: Sitting, Cuff Size: Adult)   Pulse 83   Temp 99.8 °F (37.7 °C) (Infrared)   Wt 102 kg (225 lb)   SpO2 96% Comment: room air after cough  BMI 35.24 kg/m²     BMI has not been calculated during today's encounter.       Physical Exam  Constitutional:       General: She is not in acute distress.     Appearance: She is well-developed.   HENT:      Head: Normocephalic.   Eyes:      General: Lids are normal.      Conjunctiva/sclera: Conjunctivae normal.   Neck:      Thyroid: No thyroid mass or thyromegaly.      Trachea: Trachea normal.   Cardiovascular:      Rate and Rhythm: Normal rate and regular rhythm.      Heart sounds: Normal heart sounds.   Pulmonary:      Effort: Pulmonary effort is normal.      Breath sounds: Normal breath sounds.   Abdominal:      Palpations: Abdomen is soft.   Musculoskeletal:      Cervical back: Normal range of motion.   Lymphadenopathy:      Cervical: No cervical adenopathy.   Skin:     General: Skin is warm and dry.   Neurological:      Mental Status: She is alert and oriented to person, place, and time.   Psychiatric:         Attention and Perception: She is attentive.         Mood and Affect: Mood normal.         " Speech: Speech normal.         Behavior: Behavior normal.        Result Review :  The following data was reviewed by: Keturah Grewal MD on 06/01/2022:  Common labs    Common Labsle 9/3/21 9/3/21 9/3/21 3/4/22 3/4/22 3/4/22    0823 0823 0823 0824 0824 0824   Glucose  143 (A)   153 (A)    BUN  16   13    Creatinine  0.83   0.83    eGFR Non African Am  68       Sodium  137   141    Potassium  4.5   4.4    Chloride  100   104    Calcium  9.3   9.7    Albumin  4.20   4.10    Total Bilirubin  0.4   0.5    Alkaline Phosphatase  93   97    AST (SGOT)  16   12    ALT (SGPT)  12   9    Total Cholesterol 129     126   Triglycerides 90     66   HDL Cholesterol 69 (A)     63 (A)   LDL Cholesterol  43     49   Hemoglobin A1C   6.6 (A) 6.4 (A)     (A) Abnormal value                      Assessment and Plan   Diagnoses and all orders for this visit:    1. COVID-19 virus infection (Primary)  Assessment & Plan:   COVID-19 test is positive.  Quarantine for a minimum of 5 days or until symptoms resolve - whichever is longer.  Wear an N95 mask when around others for 10 days. Take Tylenol over the counter as needed for pain or fever.  Take Mucinex over the counter as needed for cough. Rest.  Stay hydrated.  Go to the ER if symptoms worsen.       Orders:  -     POCT SARS-CoV-2 Antigen LOLA + Flu    2. Primary hypertension    Other orders  -     Nirmatrelvir & Ritonavir (PAXLOVID) 20 x 150 MG & 10 x 100MG tablet therapy pack tablet; Take 3 tablets by mouth 2 (Two) Times a Day for 5 days.  Dispense: 30 tablet; Refill: 0           Follow Up   No follow-ups on file.  Patient was given instructions and counseling regarding her condition or for health maintenance advice. Please see specific information pulled into the AVS if appropriate.

## 2022-06-01 NOTE — ASSESSMENT & PLAN NOTE
COVID-19 test is positive.  Quarantine for a minimum of 5 days or until symptoms resolve - whichever is longer.  Wear an N95 mask when around others for 10 days. Take Tylenol over the counter as needed for pain or fever.  Take Mucinex over the counter as needed for cough. Rest.  Stay hydrated.  Go to the ER if symptoms worsen.

## 2022-06-02 ENCOUNTER — TELEPHONE (OUTPATIENT)
Dept: FAMILY MEDICINE CLINIC | Facility: CLINIC | Age: 72
End: 2022-06-02

## 2022-06-02 NOTE — TELEPHONE ENCOUNTER
Caller: Reyna Castano    Relationship: Self    Best call back number: [phone]   What is the best time to reach you: ANY    Who are you requesting to speak with (clinical staff, provider,  specific staff member): OFFICE    Do you know the name of the person who called: NONE    What was the call regarding: PATIENT REQUESTS HER COVID TEST BE EMAILED TO [email or web address]      Do you require a callback:YES

## 2022-06-08 RX ORDER — LANCETS
EACH MISCELLANEOUS
Qty: 100 EACH | Refills: 1 | Status: SHIPPED | OUTPATIENT
Start: 2022-06-08 | End: 2023-03-15

## 2022-06-17 ENCOUNTER — OFFICE VISIT (OUTPATIENT)
Dept: FAMILY MEDICINE CLINIC | Facility: CLINIC | Age: 72
End: 2022-06-17

## 2022-06-17 ENCOUNTER — TELEPHONE (OUTPATIENT)
Dept: FAMILY MEDICINE CLINIC | Facility: CLINIC | Age: 72
End: 2022-06-17

## 2022-06-17 VITALS
OXYGEN SATURATION: 98 % | WEIGHT: 221 LBS | TEMPERATURE: 98.2 F | BODY MASS INDEX: 34.61 KG/M2 | DIASTOLIC BLOOD PRESSURE: 93 MMHG | SYSTOLIC BLOOD PRESSURE: 185 MMHG | HEART RATE: 66 BPM

## 2022-06-17 DIAGNOSIS — U07.1 COVID-19 VIRUS INFECTION: Primary | ICD-10-CM

## 2022-06-17 PROCEDURE — 99213 OFFICE O/P EST LOW 20 MIN: CPT | Performed by: FAMILY MEDICINE

## 2022-06-17 NOTE — TELEPHONE ENCOUNTER
Caller: Reyna Castano    Relationship: Self    Best call back number:[phone]    What form or medical record are you requesting: [facility]    Who is requesting this form or medical record from you: [facility] FOR EMPLOYER    How would you like to receive the form or medical records (pick-up, mail, fax): FAX DIRECTLY TO [facility]    If fax, what is the fax number: WILL BE ON PAPERWORK FROM [facility] THEY ARE SENDING OVER TODAY.     Timeframe paperwork needed: ASAP     Additional notes: INTERMITTEN DAYS OFF NEED TO BE CHANGED TO FMLA (SAID THIS IS UNDER A DIFF NAME WITH [employer] [facility]) PATIENT NEEDS TO BE SCHEDULED TO BE OFF 5 DAYS NEXT WEEK, THEN PLAN TO TAKE 2 DAYS OFF PER WEEK FOR THE NEXT 6 WEEKS.

## 2022-06-26 RX ORDER — LINAGLIPTIN 5 MG/1
TABLET, FILM COATED ORAL
Qty: 90 TABLET | Refills: 3 | Status: SHIPPED | OUTPATIENT
Start: 2022-06-26

## 2022-06-26 RX ORDER — ATORVASTATIN CALCIUM 10 MG/1
TABLET, FILM COATED ORAL
Qty: 90 TABLET | Refills: 3 | Status: SHIPPED | OUTPATIENT
Start: 2022-06-26

## 2022-08-24 RX ORDER — CARVEDILOL 25 MG/1
TABLET ORAL
Qty: 180 TABLET | Refills: 1 | Status: SHIPPED | OUTPATIENT
Start: 2022-08-24

## 2022-09-30 ENCOUNTER — LAB (OUTPATIENT)
Dept: FAMILY MEDICINE CLINIC | Facility: CLINIC | Age: 72
End: 2022-09-30

## 2022-09-30 ENCOUNTER — OFFICE VISIT (OUTPATIENT)
Dept: FAMILY MEDICINE CLINIC | Facility: CLINIC | Age: 72
End: 2022-09-30

## 2022-09-30 VITALS
HEIGHT: 67 IN | SYSTOLIC BLOOD PRESSURE: 203 MMHG | RESPIRATION RATE: 16 BRPM | DIASTOLIC BLOOD PRESSURE: 84 MMHG | OXYGEN SATURATION: 95 % | WEIGHT: 223 LBS | BODY MASS INDEX: 35 KG/M2 | TEMPERATURE: 98 F | HEART RATE: 85 BPM

## 2022-09-30 DIAGNOSIS — Z23 NEED FOR VACCINATION: ICD-10-CM

## 2022-09-30 DIAGNOSIS — E11.8 DISORDER ASSOCIATED WITH TYPE 2 DIABETES MELLITUS: ICD-10-CM

## 2022-09-30 DIAGNOSIS — Z00.00 ENCOUNTER FOR GENERAL ADULT MEDICAL EXAMINATION WITHOUT ABNORMAL FINDINGS: Primary | ICD-10-CM

## 2022-09-30 DIAGNOSIS — Z11.59 ENCOUNTER FOR HEPATITIS C SCREENING TEST FOR LOW RISK PATIENT: ICD-10-CM

## 2022-09-30 DIAGNOSIS — Z12.31 ENCOUNTER FOR SCREENING MAMMOGRAM FOR MALIGNANT NEOPLASM OF BREAST: ICD-10-CM

## 2022-09-30 LAB
ALBUMIN SERPL-MCNC: 4.3 G/DL (ref 3.5–5.2)
ALBUMIN UR-MCNC: 66 MG/DL
ALBUMIN/GLOB SERPL: 1.7 G/DL
ALP SERPL-CCNC: 82 U/L (ref 39–117)
ALT SERPL W P-5'-P-CCNC: 12 U/L (ref 1–33)
ANION GAP SERPL CALCULATED.3IONS-SCNC: 13.1 MMOL/L (ref 5–15)
AST SERPL-CCNC: 22 U/L (ref 1–32)
BILIRUB SERPL-MCNC: 0.5 MG/DL (ref 0–1.2)
BUN SERPL-MCNC: 14 MG/DL (ref 8–23)
BUN/CREAT SERPL: 20.6 (ref 7–25)
CALCIUM SPEC-SCNC: 9.4 MG/DL (ref 8.6–10.5)
CHLORIDE SERPL-SCNC: 104 MMOL/L (ref 98–107)
CHOLEST SERPL-MCNC: 130 MG/DL (ref 0–200)
CO2 SERPL-SCNC: 24.9 MMOL/L (ref 22–29)
CREAT SERPL-MCNC: 0.68 MG/DL (ref 0.57–1)
CREAT UR-MCNC: 32.8 MG/DL
EGFRCR SERPLBLD CKD-EPI 2021: 92.1 ML/MIN/1.73
GLOBULIN UR ELPH-MCNC: 2.5 GM/DL
GLUCOSE SERPL-MCNC: 125 MG/DL (ref 65–99)
HBA1C MFR BLD: 6.1 % (ref 3.5–5.6)
HCV AB SER DONR QL: NORMAL
HDLC SERPL-MCNC: 71 MG/DL (ref 40–60)
LDLC SERPL CALC-MCNC: 40 MG/DL (ref 0–100)
LDLC/HDLC SERPL: 0.53 {RATIO}
MICROALBUMIN/CREAT UR: 2012.2 MG/G
POTASSIUM SERPL-SCNC: 4.1 MMOL/L (ref 3.5–5.2)
PROT SERPL-MCNC: 6.8 G/DL (ref 6–8.5)
SODIUM SERPL-SCNC: 142 MMOL/L (ref 136–145)
TRIGL SERPL-MCNC: 107 MG/DL (ref 0–150)
VLDLC SERPL-MCNC: 19 MG/DL (ref 5–40)

## 2022-09-30 PROCEDURE — 80053 COMPREHEN METABOLIC PANEL: CPT | Performed by: FAMILY MEDICINE

## 2022-09-30 PROCEDURE — 36415 COLL VENOUS BLD VENIPUNCTURE: CPT | Performed by: FAMILY MEDICINE

## 2022-09-30 PROCEDURE — 82570 ASSAY OF URINE CREATININE: CPT | Performed by: FAMILY MEDICINE

## 2022-09-30 PROCEDURE — 82043 UR ALBUMIN QUANTITATIVE: CPT | Performed by: FAMILY MEDICINE

## 2022-09-30 PROCEDURE — 99397 PER PM REEVAL EST PAT 65+ YR: CPT | Performed by: FAMILY MEDICINE

## 2022-09-30 PROCEDURE — 90662 IIV NO PRSV INCREASED AG IM: CPT | Performed by: FAMILY MEDICINE

## 2022-09-30 PROCEDURE — G0008 ADMIN INFLUENZA VIRUS VAC: HCPCS | Performed by: FAMILY MEDICINE

## 2022-09-30 PROCEDURE — 80061 LIPID PANEL: CPT | Performed by: FAMILY MEDICINE

## 2022-09-30 PROCEDURE — 83036 HEMOGLOBIN GLYCOSYLATED A1C: CPT | Performed by: FAMILY MEDICINE

## 2022-09-30 PROCEDURE — 86803 HEPATITIS C AB TEST: CPT | Performed by: FAMILY MEDICINE

## 2022-09-30 NOTE — PROGRESS NOTES
The ABCs of the Annual Wellness Visit  Subsequent Medicare Wellness Visit    Chief Complaint   Patient presents with   • Diabetes     6 month follow up      Subjective    History of Present Illness:  Reyna Castano is a 72 y.o. female who presents for a Subsequent Medicare Wellness Visit.    The following portions of the patient's history were reviewed and   updated as appropriate: allergies, current medications, past family history, past medical history, past social history, past surgical history and problem list.    Compared to one year ago, the patient feels her physical   health is the same.    Compared to one year ago, the patient feels her mental   health is the same.    Recent Hospitalizations:  She was not admitted to the hospital during the last year.       Current Medical Providers:  Patient Care Team:  Keturah Grewal MD as PCP - General (Family Medicine)    Outpatient Medications Prior to Visit   Medication Sig Dispense Refill   • Accu-Chek Softclix Lancets lancets USE WITH GLUCOMETER TO TEST BLOOD SUGAR ONE TIME DAILY 100 each 1   • atorvastatin (LIPITOR) 10 MG tablet TAKE 1 TABLET EVERY NIGHT FOR HIGH CHOLESTEROL 90 tablet 3   • Blood Glucose Monitoring Suppl (Accu-Chek Salud Plus) w/Device kit Use to check blood sugar once daily E11.9 1 kit 0   • carvedilol (COREG) 25 MG tablet TAKE 1 TABLET TWICE DAILY WITH MEALS 180 tablet 1   • glucose blood (Accu-Chek Salud Plus) test strip TEST EVERY DAY AS INSTRUCTED 100 each 1   • metFORMIN (GLUCOPHAGE) 500 MG tablet TAKE 1 TABLET TWICE DAILY 180 tablet 3   • Tradjenta 5 MG tablet tablet TAKE 1 TABLET EVERY DAY FOR DIABETES 90 tablet 3   • NON FORMULARY Cataract surgery drops       No facility-administered medications prior to visit.       No opioid medication identified on active medication list. I have reviewed chart for other potential  high risk medication/s and harmful drug interactions in the elderly.          Aspirin is not on active medication list.   "Aspirin use is indicated based on review of current medical condition/s. Pros and cons of this therapy have been discussed with this patient. Benefits of this medication outweigh potential harm.  Patient has been instructed to start taking this medication..    Patient Active Problem List   Diagnosis   • Disorder associated with type 2 diabetes mellitus (HCC)   • Encounter for general adult medical examination without abnormal findings   • Gastroesophageal reflux disease   • Hypertension   • Hypokalemia   • Acute non-recurrent maxillary sinusitis   • Retrolisthesis of vertebrae   • Neural foraminal stenosis of lumbar spine   • Morbidly obese (HCC)   • Anemia due to acute blood loss   • Hay fever   • Hematochezia   • Encounter for immunization   • Degenerative disc disease, lumbar   • Encounter for screening mammogram for breast cancer   • Need for vaccination   • Medicare annual wellness visit, subsequent   • COVID-19 virus infection     Advance Care Planning  Advance Directive is not on file.  ACP discussion was held with the patient during this visit. Patient does not have an advance directive, information provided.          Objective    Vitals:    09/30/22 0844   BP: (!) 203/84   BP Location: Left arm   Patient Position: Sitting   Cuff Size: Adult   Pulse: 85   Resp: 16   Temp: 98 °F (36.7 °C)   SpO2: 95%   Weight: 101 kg (223 lb)   Height: 170.2 cm (67\")     Estimated body mass index is 34.93 kg/m² as calculated from the following:    Height as of this encounter: 170.2 cm (67\").    Weight as of this encounter: 101 kg (223 lb).           Does the patient have evidence of cognitive impairment? No    Physical Exam  Constitutional:       General: She is not in acute distress.     Appearance: She is well-developed.   HENT:      Head: Normocephalic.      Right Ear: Tympanic membrane normal.      Left Ear: Tympanic membrane normal.   Eyes:      General: Lids are normal.      Conjunctiva/sclera: Conjunctivae normal. "   Neck:      Thyroid: No thyroid mass or thyromegaly.      Trachea: Trachea normal.   Cardiovascular:      Rate and Rhythm: Normal rate and regular rhythm.      Heart sounds: Normal heart sounds.   Pulmonary:      Effort: Pulmonary effort is normal.      Breath sounds: Normal breath sounds.   Abdominal:      Palpations: Abdomen is soft.   Musculoskeletal:      Cervical back: Normal range of motion.      Right lower leg: No edema.      Left lower leg: No edema.   Lymphadenopathy:      Cervical: No cervical adenopathy.   Skin:     General: Skin is warm and dry.   Neurological:      Mental Status: She is alert and oriented to person, place, and time.   Psychiatric:         Attention and Perception: She is attentive.         Mood and Affect: Mood normal.         Speech: Speech normal.         Behavior: Behavior normal.                 HEALTH RISK ASSESSMENT    Smoking Status:  Social History     Tobacco Use   Smoking Status Never Smoker   Smokeless Tobacco Never Used     Alcohol Consumption:  Social History     Substance and Sexual Activity   Alcohol Use No    Comment: caffeine free drinks     Fall Risk Screen:    [facility] Fall Risk Assessment was completed, and patient is at LOW risk for falls.Assessment completed on:9/30/2022    Depression Screening:  PHQ-2/PHQ-9 Depression Screening 9/30/2022   Retired Total Score -   Little Interest or Pleasure in Doing Things 0-->not at all   Feeling Down, Depressed or Hopeless 0-->not at all   PHQ-9: Brief Depression Severity Measure Score 0       Health Habits and Functional and Cognitive Screening:  Functional & Cognitive Status 9/30/2022   Do you have difficulty preparing food and eating? No   Do you have difficulty bathing yourself, getting dressed or grooming yourself? No   Do you have difficulty using the toilet? No   Do you have difficulty moving around from place to place? No   Do you have trouble with steps or getting out of a bed or a chair? No   Current Diet Diabetic Diet    Dental Exam Up to date   Eye Exam Up to date   Do you need help using the phone?  No   Are you deaf or do you have serious difficulty hearing?  No   Do you need help with transportation? No   Do you need help shopping? No   Do you need help preparing meals?  No   Do you need help with housework?  No   Do you need help with laundry? No   Do you need help taking your medications? No   Do you ever drive or ride in a car without wearing a seat belt? No   Have you felt unusual stress, anger or loneliness in the last month? No   Who do you live with? Spouse   If you need help, do you have trouble finding someone available to you? No   Do you have difficulty concentrating, remembering or making decisions? No       Age-appropriate Screening Schedule:  Refer to the list below for future screening recommendations based on patient's age, sex and/or medical conditions. Orders for these recommended tests are listed in the plan section. The patient has been provided with a written plan.    Health Maintenance   Topic Date Due   • URINE MICROALBUMIN  Never done   • DXA SCAN  Never done   • TDAP/TD VACCINES (1 - Tdap) Never done   • ZOSTER VACCINE (1 of 2) Never done   • DIABETIC FOOT EXAM  Never done   • DIABETIC EYE EXAM  07/12/2022   • INFLUENZA VACCINE  08/01/2022   • HEMOGLOBIN A1C  09/04/2022   • MAMMOGRAM  09/30/2023 (Originally 1950)              Assessment & Plan   CMS Preventative Services Quick Reference  Risk Factors Identified During Encounter  Obesity/Overweight   The above risks/problems have been discussed with the patient.  Follow up actions/plans if indicated are seen below in the Assessment/Plan Section.  Pertinent information has been shared with the patient in the After Visit Summary.    Diagnoses and all orders for this visit:    1. Encounter for general adult medical examination without abnormal findings (Primary)    2. Disorder associated with type 2 diabetes mellitus (HCC)  -     Comprehensive  Metabolic Panel  -     Lipid Panel  -     Hemoglobin A1c  -     Microalbumin / Creatinine Urine Ratio - Urine, Clean Catch    3. Encounter for screening mammogram for malignant neoplasm of breast    4. Encounter for hepatitis C screening test for low risk patient  -     Hepatitis C antibody; Future        Follow Up:   Return in about 6 months (around 3/30/2023) for Recheck.     An After Visit Summary and PPPS were made available to the patient.

## 2022-12-20 ENCOUNTER — OFFICE VISIT (OUTPATIENT)
Dept: FAMILY MEDICINE CLINIC | Facility: CLINIC | Age: 72
End: 2022-12-20

## 2022-12-20 VITALS
HEIGHT: 67 IN | DIASTOLIC BLOOD PRESSURE: 84 MMHG | OXYGEN SATURATION: 95 % | HEART RATE: 70 BPM | WEIGHT: 215 LBS | SYSTOLIC BLOOD PRESSURE: 191 MMHG | BODY MASS INDEX: 33.74 KG/M2 | TEMPERATURE: 98.2 F

## 2022-12-20 DIAGNOSIS — J06.9 ACUTE URI: Primary | ICD-10-CM

## 2022-12-20 DIAGNOSIS — R05.9 COUGH IN ADULT: ICD-10-CM

## 2022-12-20 DIAGNOSIS — E11.43 TYPE 2 DIABETES MELLITUS WITH DIABETIC AUTONOMIC NEUROPATHY, WITHOUT LONG-TERM CURRENT USE OF INSULIN: ICD-10-CM

## 2022-12-20 DIAGNOSIS — I10 PRIMARY HYPERTENSION: ICD-10-CM

## 2022-12-20 LAB
EXPIRATION DATE: NORMAL
FLUAV AG UPPER RESP QL IA.RAPID: NOT DETECTED
FLUBV AG UPPER RESP QL IA.RAPID: NOT DETECTED
INTERNAL CONTROL: NORMAL
Lab: NORMAL
SARS-COV-2 AG UPPER RESP QL IA.RAPID: NOT DETECTED

## 2022-12-20 PROCEDURE — 87428 SARSCOV & INF VIR A&B AG IA: CPT | Performed by: FAMILY MEDICINE

## 2022-12-20 PROCEDURE — U0005 INFEC AGEN DETEC AMPLI PROBE: HCPCS | Performed by: FAMILY MEDICINE

## 2022-12-20 PROCEDURE — 99213 OFFICE O/P EST LOW 20 MIN: CPT | Performed by: FAMILY MEDICINE

## 2022-12-20 PROCEDURE — C9803 HOPD COVID-19 SPEC COLLECT: HCPCS | Performed by: FAMILY MEDICINE

## 2022-12-20 PROCEDURE — U0004 COV-19 TEST NON-CDC HGH THRU: HCPCS | Performed by: FAMILY MEDICINE

## 2022-12-20 RX ORDER — CEFDINIR 300 MG/1
300 CAPSULE ORAL 2 TIMES DAILY
Qty: 20 CAPSULE | Refills: 0 | Status: SHIPPED | OUTPATIENT
Start: 2022-12-20 | End: 2023-03-24 | Stop reason: SDUPTHER

## 2022-12-20 NOTE — PROGRESS NOTES
"Chief Complaint  Headache (12-19) and Cough    Subjective        Reyna Castano presents to [facility]  Cough  This is a new problem. The current episode started yesterday. The problem has been rapidly worsening. The problem occurs every few minutes. The cough is productive of sputum. Associated symptoms include chills and headaches. Pertinent negatives include no chest pain, ear pain, fever, rhinorrhea, sore throat, shortness of breath or wheezing. Nothing aggravates the symptoms. She has tried OTC cough suppressant for the symptoms. The treatment provided no relief.       Objective   Vital Signs:  BP (!) 191/84 (BP Location: Left arm, Patient Position: Sitting, Cuff Size: Adult) Comment (BP Location): forearm  Pulse 70   Temp 98.2 °F (36.8 °C) (Infrared)   Ht 170.2 cm (67\")   Wt 97.5 kg (215 lb)   SpO2 95%   BMI 33.67 kg/m²   Estimated body mass index is 33.67 kg/m² as calculated from the following:    Height as of this encounter: 170.2 cm (67\").    Weight as of this encounter: 97.5 kg (215 lb).          Physical Exam  Constitutional:       General: She is not in acute distress.     Appearance: She is well-developed.   HENT:      Head: Normocephalic.   Eyes:      General: Lids are normal.      Conjunctiva/sclera: Conjunctivae normal.   Neck:      Thyroid: No thyroid mass or thyromegaly.      Trachea: Trachea normal.   Cardiovascular:      Rate and Rhythm: Normal rate and regular rhythm.      Heart sounds: Normal heart sounds.   Pulmonary:      Effort: Pulmonary effort is normal.      Breath sounds: Rhonchi present.   Musculoskeletal:      Cervical back: Normal range of motion.   Lymphadenopathy:      Cervical: No cervical adenopathy.   Skin:     General: Skin is warm and dry.   Neurological:      Mental Status: She is alert and oriented to person, place, and time.   Psychiatric:         Attention and Perception: She is attentive.         Mood and Affect: Mood normal.         " Speech: Speech normal.         Behavior: Behavior normal.        Result Review :  The following data was reviewed by: Keturah Grewal MD on 12/20/2022:  Common labs    Common Labs 3/4/22 3/4/22 3/4/22 9/30/22 9/30/22 9/30/22 9/30/22    0824 0824 0824 0922 0922 0922 0922   Glucose  153 (A)    125 (A)    BUN  13    14    Creatinine  0.83    0.68    Sodium  141    142    Potassium  4.4    4.1    Chloride  104    104    Calcium  9.7    9.4    Albumin  4.10    4.30    Total Bilirubin  0.5    0.5    Alkaline Phosphatase  97    82    AST (SGOT)  12    22    ALT (SGPT)  9    12    Total Cholesterol   126    130   Triglycerides   66    107   HDL Cholesterol   63 (A)    71 (A)   LDL Cholesterol    49    40   Hemoglobin A1C 6.4 (A)   6.1 (A)      Microalbumin, Urine     66.0     (A) Abnormal value                      Assessment and Plan   Diagnoses and all orders for this visit:    1. Acute URI (Primary)  -     POCT SARS-CoV-2 Antigen LOLA + Flu  -     Respiratory Panel PCR w/COVID-19(SARS-CoV-2) TUAN/NEO/NICK/PAD/COR/MAD/PLACIDO In-House, NP Swab in UTM/VTM, 3-4 HR TAT - Swab, Nasopharynx; Future  -     cefdinir (OMNICEF) 300 MG capsule; Take 1 capsule by mouth 2 (Two) Times a Day.  Dispense: 20 capsule; Refill: 0    2. Cough in adult  -     POCT SARS-CoV-2 Antigen LOLA + Flu  -     Respiratory Panel PCR w/COVID-19(SARS-CoV-2) TUAN/NEO/NICK/PAD/COR/MAD/PLACIDO In-House, NP Swab in UTM/VTM, 3-4 HR TAT - Swab, Nasopharynx; Future    3. Primary hypertension    4. Type 2 diabetes mellitus with diabetic autonomic neuropathy, without long-term current use of insulin (HCC)  -     Respiratory Panel PCR w/COVID-19(SARS-CoV-2) TUAN/NEO/NICK/PAD/COR/MAD/PLACIDO In-House, NP Swab in UTM/VTM, 3-4 HR TAT - Swab, Nasopharynx; Future             Follow Up   No follow-ups on file.  Patient was given instructions and counseling regarding her condition or for health maintenance advice. Please see specific information pulled into the AVS if appropriate.

## 2022-12-21 ENCOUNTER — TELEPHONE (OUTPATIENT)
Dept: FAMILY MEDICINE CLINIC | Facility: CLINIC | Age: 72
End: 2022-12-21

## 2022-12-21 LAB — SARS-COV-2 ORF1AB RESP QL NAA+PROBE: NOT DETECTED

## 2022-12-21 NOTE — TELEPHONE ENCOUNTER
Caller: Reyna Castano    Relationship: Self    Best call back number:     Caller requesting test results:     What test was performed: LABS/THROAT CULTURE    When was the test performed: 12/20/22    Where was the test performed: OFFICE OF DR CARROLL    Additional notes: PATIENT IS CALLING IN TO SEE IF DR CARROLL HAS HER RESULTS YET.

## 2022-12-28 NOTE — TELEPHONE ENCOUNTER
Spoke with the patient and she was reminded that her COVID test was NEG she stated she thought we talked about this but couldn't remember. I also told her I did e mail her letter again for her and I asked for her to check if it came there if it did not I will try again and if it doesn't she may have to come pick this letter up. She was okay with that and will call back if she did not receive the letter via e mail

## 2022-12-28 NOTE — TELEPHONE ENCOUNTER
Caller: Reyna Castano    Relationship to patient: Self    Best call back number: [phone]    Patient is needing: PATIENT CALLED AND SAID SHE HAD NOT RECEIVED HER TEST RESULTS AND REQUESTED A CALLBACK    SHE SAID SHE ALSO HAD NOT RECEIVED AN EMAIL WITH HER WORK EXCUSE NOTE    E-MAIL: [email or web address]

## 2023-01-09 NOTE — PROGRESS NOTES
Chief Complaint  Follow-up (6 month f/u )    Subjective          Reyna Castano presents to [facility]  Diabetes  She has type 2 diabetes mellitus. Her disease course has been stable. Pertinent negatives for hypoglycemia include no headaches. There are no diabetic associated symptoms. Pertinent negatives for diabetes include no blurred vision. There are no hypoglycemic complications. Symptoms are stable. Her weight is stable. She is following a diabetic diet. Meal planning includes avoidance of concentrated sweets. She participates in exercise intermittently. Her home blood glucose trend is decreasing steadily. Her overall blood glucose range is 110-130 mg/dl.   Hypertension  This is a chronic problem. The current episode started more than 1 year ago. The problem has been waxing and waning since onset. The problem is uncontrolled. Pertinent negatives include no anxiety, blurred vision, headaches, neck pain, palpitations, peripheral edema or shortness of breath. There are no associated agents to hypertension. Current antihypertension treatment includes beta blockers. The current treatment provides mild improvement. There are no compliance problems.        Objective   Vital Signs:   BP (!) 184/64   Pulse 66   Temp 97.5 °F (36.4 °C) (Infrared)   Wt 108 kg (238 lb)   SpO2 94%   BMI 37.28 kg/m²     Physical Exam  Constitutional:       General: She is not in acute distress.     Appearance: She is well-developed.   HENT:      Head: Normocephalic.   Eyes:      General: Lids are normal.      Conjunctiva/sclera: Conjunctivae normal.   Neck:      Thyroid: No thyroid mass or thyromegaly.      Trachea: Trachea normal.   Cardiovascular:      Rate and Rhythm: Normal rate and regular rhythm.      Heart sounds: Normal heart sounds.   Pulmonary:      Effort: Pulmonary effort is normal.      Breath sounds: Normal breath sounds.   Abdominal:      Palpations: Abdomen is soft.   Musculoskeletal:       Cervical back: Normal range of motion.   Lymphadenopathy:      Cervical: No cervical adenopathy.   Skin:     General: Skin is warm and dry.   Neurological:      Mental Status: She is alert and oriented to person, place, and time.   Psychiatric:         Attention and Perception: She is attentive.         Mood and Affect: Mood normal.         Speech: Speech normal.         Behavior: Behavior normal.        Result Review :   The following data was reviewed by: Keturah Grewal MD on 03/04/2022:  Common labs    Common Labsle 9/3/21 9/3/21 9/3/21 3/4/22    0823 0823 0823    Glucose  143 (A)     BUN  16     Creatinine  0.83     eGFR Non African Am  68     Sodium  137     Potassium  4.5     Chloride  100     Calcium  9.3     Albumin  4.20     Total Bilirubin  0.4     Alkaline Phosphatase  93     AST (SGOT)  16     ALT (SGPT)  12     Total Cholesterol 129      Triglycerides 90      HDL Cholesterol 69 (A)      LDL Cholesterol  43      Hemoglobin A1C   6.6 (A) 6.4 (A)   (A) Abnormal value                      Assessment and Plan    Diagnoses and all orders for this visit:    1. Disorder associated with type 2 diabetes mellitus (HCC) (Primary)  -     Comprehensive Metabolic Panel  -     Hemoglobin A1c  -     Lipid Panel  -     Microalbumin / Creatinine Urine Ratio - Urine, Clean Catch    2. Primary hypertension    Other orders  -     carvedilol (Coreg) 25 MG tablet; Take 1 tablet by mouth 2 (Two) Times a Day With Meals.  Dispense: 180 tablet; Refill: 1        Follow Up   No follow-ups on file.  Patient was given instructions and counseling regarding her condition or for health maintenance advice. Please see specific information pulled into the AVS if appropriate.        Cheiloplasty (Less Than 50%) Text: A decision was made to reconstruct the defect with a  cheiloplasty.  The defect was undermined extensively.  Additional orbicularis oris muscle was excised with a 15 blade scalpel.  The defect was converted into a full thickness wedge, of less than 50% of the vertical height of the lip, to facilite a better cosmetic result.  Small vessels were then tied off with 5-0 monocyrl. The orbicularis oris, superficial fascia, adipose and dermis were then reapproximated.  After the deeper layers were approximated the epidermis was reapproximated with particular care given to realign the vermilion border.

## 2023-03-15 RX ORDER — LANCETS
EACH MISCELLANEOUS
Qty: 100 EACH | Refills: 1 | Status: SHIPPED | OUTPATIENT
Start: 2023-03-15

## 2023-03-24 ENCOUNTER — OFFICE VISIT (OUTPATIENT)
Dept: FAMILY MEDICINE CLINIC | Facility: CLINIC | Age: 73
End: 2023-03-24
Payer: COMMERCIAL

## 2023-03-24 ENCOUNTER — LAB (OUTPATIENT)
Dept: FAMILY MEDICINE CLINIC | Facility: CLINIC | Age: 73
End: 2023-03-24
Payer: MEDICARE

## 2023-03-24 VITALS
HEIGHT: 67 IN | TEMPERATURE: 97.4 F | SYSTOLIC BLOOD PRESSURE: 177 MMHG | OXYGEN SATURATION: 95 % | DIASTOLIC BLOOD PRESSURE: 107 MMHG | WEIGHT: 211.8 LBS | HEART RATE: 83 BPM | BODY MASS INDEX: 33.24 KG/M2

## 2023-03-24 DIAGNOSIS — J06.9 ACUTE URI: ICD-10-CM

## 2023-03-24 DIAGNOSIS — I10 PRIMARY HYPERTENSION: ICD-10-CM

## 2023-03-24 DIAGNOSIS — E11.43 TYPE 2 DIABETES MELLITUS WITH DIABETIC AUTONOMIC NEUROPATHY, WITHOUT LONG-TERM CURRENT USE OF INSULIN: Primary | ICD-10-CM

## 2023-03-24 LAB
ALBUMIN SERPL-MCNC: 4.2 G/DL (ref 3.5–5.2)
ALBUMIN UR-MCNC: 44.6 MG/DL
ALBUMIN/GLOB SERPL: 1.5 G/DL
ALP SERPL-CCNC: 87 U/L (ref 39–117)
ALT SERPL W P-5'-P-CCNC: 10 U/L (ref 1–33)
ANION GAP SERPL CALCULATED.3IONS-SCNC: 11 MMOL/L (ref 5–15)
AST SERPL-CCNC: 18 U/L (ref 1–32)
BILIRUB SERPL-MCNC: 0.4 MG/DL (ref 0–1.2)
BUN SERPL-MCNC: 18 MG/DL (ref 8–23)
BUN/CREAT SERPL: 19.8 (ref 7–25)
CALCIUM SPEC-SCNC: 9.3 MG/DL (ref 8.6–10.5)
CHLORIDE SERPL-SCNC: 103 MMOL/L (ref 98–107)
CHOLEST SERPL-MCNC: 144 MG/DL (ref 0–200)
CO2 SERPL-SCNC: 27 MMOL/L (ref 22–29)
CREAT SERPL-MCNC: 0.91 MG/DL (ref 0.57–1)
CREAT UR-MCNC: 95.2 MG/DL
EGFRCR SERPLBLD CKD-EPI 2021: 66.8 ML/MIN/1.73
GLOBULIN UR ELPH-MCNC: 2.8 GM/DL
GLUCOSE SERPL-MCNC: 111 MG/DL (ref 65–99)
HBA1C MFR BLD: 6.1 % (ref 4.8–5.6)
HDLC SERPL-MCNC: 66 MG/DL (ref 40–60)
LDLC SERPL CALC-MCNC: 55 MG/DL (ref 0–100)
LDLC/HDLC SERPL: 0.77 {RATIO}
MICROALBUMIN/CREAT UR: 468.5 MG/G
POTASSIUM SERPL-SCNC: 4.2 MMOL/L (ref 3.5–5.2)
PROT SERPL-MCNC: 7 G/DL (ref 6–8.5)
SODIUM SERPL-SCNC: 141 MMOL/L (ref 136–145)
TRIGL SERPL-MCNC: 137 MG/DL (ref 0–150)
VLDLC SERPL-MCNC: 23 MG/DL (ref 5–40)

## 2023-03-24 PROCEDURE — 82570 ASSAY OF URINE CREATININE: CPT | Performed by: FAMILY MEDICINE

## 2023-03-24 PROCEDURE — 36415 COLL VENOUS BLD VENIPUNCTURE: CPT | Performed by: FAMILY MEDICINE

## 2023-03-24 PROCEDURE — 1159F MED LIST DOCD IN RCRD: CPT | Performed by: FAMILY MEDICINE

## 2023-03-24 PROCEDURE — 80053 COMPREHEN METABOLIC PANEL: CPT | Performed by: FAMILY MEDICINE

## 2023-03-24 PROCEDURE — 80061 LIPID PANEL: CPT | Performed by: FAMILY MEDICINE

## 2023-03-24 PROCEDURE — 3080F DIAST BP >= 90 MM HG: CPT | Performed by: FAMILY MEDICINE

## 2023-03-24 PROCEDURE — 3077F SYST BP >= 140 MM HG: CPT | Performed by: FAMILY MEDICINE

## 2023-03-24 PROCEDURE — 83036 HEMOGLOBIN GLYCOSYLATED A1C: CPT | Performed by: FAMILY MEDICINE

## 2023-03-24 PROCEDURE — 1160F RVW MEDS BY RX/DR IN RCRD: CPT | Performed by: FAMILY MEDICINE

## 2023-03-24 PROCEDURE — 99214 OFFICE O/P EST MOD 30 MIN: CPT | Performed by: FAMILY MEDICINE

## 2023-03-24 PROCEDURE — 82043 UR ALBUMIN QUANTITATIVE: CPT | Performed by: FAMILY MEDICINE

## 2023-03-24 RX ORDER — CEFDINIR 300 MG/1
300 CAPSULE ORAL 2 TIMES DAILY
Qty: 20 CAPSULE | Refills: 0 | Status: SHIPPED | OUTPATIENT
Start: 2023-03-24

## 2023-03-24 NOTE — PROGRESS NOTES
Chief Complaint  Hypertension, Diabetes, and Follow-up (3 month f/u )    Subjective        Reyna Castano presents to [facility]  History of Present Illness  Home blood pressure readngs are much better 149/67 at home this morning.   Home blood sugar readings are stable.   Hypertension  This is a chronic problem. The current episode started more than 1 year ago. The problem has been waxing and waning since onset. The problem is uncontrolled. Pertinent negatives include no chest pain, headaches, neck pain, peripheral edema or shortness of breath. There are no associated agents to hypertension. Current antihypertension treatment includes beta blockers. The current treatment provides mild improvement. There are no compliance problems.    Diabetes  She presents for her follow-up diabetic visit. She has type 2 diabetes mellitus. Her disease course has been stable. There are no hypoglycemic associated symptoms. Pertinent negatives for hypoglycemia include no headaches. There are no diabetic associated symptoms. Pertinent negatives for diabetes include no chest pain. There are no hypoglycemic complications. Symptoms are stable. Current diabetic treatment includes oral agent (dual therapy). She is compliant with treatment most of the time. Her weight is stable. She is following a diabetic diet. Meal planning includes avoidance of concentrated sweets. She participates in exercise intermittently. There is no change in her home blood glucose trend. Her overall blood glucose range is 110-130 mg/dl. An ACE inhibitor/angiotensin II receptor blocker is being taken.   Sinusitis  This is a new problem. The current episode started 1 to 4 weeks ago. The problem has been waxing and waning since onset. There has been no fever. Associated symptoms include congestion and coughing. Pertinent negatives include no headaches, neck pain or shortness of breath. Past treatments include nothing.       Objective  "  Vital Signs:  BP (!) 177/107 (BP Location: Left arm, Patient Position: Sitting, Cuff Size: Large Adult)   Pulse 83   Temp 97.4 °F (36.3 °C) (Infrared)   Ht 170.2 cm (67\")   Wt 96.1 kg (211 lb 12.8 oz)   SpO2 95%   BMI 33.17 kg/m²   Estimated body mass index is 33.17 kg/m² as calculated from the following:    Height as of this encounter: 170.2 cm (67\").    Weight as of this encounter: 96.1 kg (211 lb 12.8 oz).             Physical Exam  Vitals and nursing note reviewed.   Constitutional:       General: She is not in acute distress.     Appearance: She is well-developed.   HENT:      Head: Normocephalic.   Eyes:      General: Lids are normal.      Conjunctiva/sclera: Conjunctivae normal.   Neck:      Thyroid: No thyroid mass or thyromegaly.      Trachea: Trachea normal.   Cardiovascular:      Rate and Rhythm: Normal rate and regular rhythm.      Heart sounds: Normal heart sounds.   Pulmonary:      Effort: Pulmonary effort is normal.      Breath sounds: Rhonchi present.   Musculoskeletal:      Cervical back: Normal range of motion.   Lymphadenopathy:      Cervical: No cervical adenopathy.   Skin:     General: Skin is warm and dry.   Neurological:      Mental Status: She is alert and oriented to person, place, and time.   Psychiatric:         Attention and Perception: She is attentive.         Mood and Affect: Mood normal.         Speech: Speech normal.         Behavior: Behavior normal.        Result Review :  The following data was reviewed by: Keturah Grewal MD on 03/24/2023:  Common labs    Common Labs 9/30/22 9/30/22 9/30/22 9/30/22    0922 0922 0922 0922   Glucose   125 (A)    BUN   14    Creatinine   0.68    Sodium   142    Potassium   4.1    Chloride   104    Calcium   9.4    Albumin   4.30    Total Bilirubin   0.5    Alkaline Phosphatase   82    AST (SGOT)   22    ALT (SGPT)   12    Total Cholesterol    130   Triglycerides    107   HDL Cholesterol    71 (A)   LDL Cholesterol     40   Hemoglobin A1C " 6.1 (A)      Microalbumin, Urine  66.0     (A) Abnormal value                         Assessment and Plan   Diagnoses and all orders for this visit:    1. Type 2 diabetes mellitus with diabetic autonomic neuropathy, without long-term current use of insulin (HCC) (Primary)  Assessment & Plan:  Diabetes is improving with treatment.   Continue current treatment regimen.  Diabetes will be reassessed in 6 months.    Orders:  -     Comprehensive Metabolic Panel  -     Hemoglobin A1c  -     Lipid Panel  -     Microalbumin / Creatinine Urine Ratio - Urine, Clean Catch    2. Acute URI  -     cefdinir (OMNICEF) 300 MG capsule; Take 1 capsule by mouth 2 (Two) Times a Day.  Dispense: 20 capsule; Refill: 0    3. Primary hypertension  Assessment & Plan:  Hypertension is improving with treatment.  Continue current treatment regimen.  Dietary sodium restriction.  Weight loss.  Regular aerobic exercise.  Continue current medications.  Ambulatory blood pressure monitoring.  Blood pressure will be reassessed at the next regular appointment.             Follow Up   Return in about 6 months (around 9/24/2023).  Patient was given instructions and counseling regarding her condition or for health maintenance advice. Please see specific information pulled into the AVS if appropriate.

## 2023-04-10 RX ORDER — CARVEDILOL 25 MG/1
TABLET ORAL
Qty: 180 TABLET | Refills: 1 | Status: SHIPPED | OUTPATIENT
Start: 2023-04-10

## 2023-05-05 RX ORDER — LANCETS
100 EACH MISCELLANEOUS DAILY
Qty: 100 EACH | Refills: 1 | Status: SHIPPED | OUTPATIENT
Start: 2023-05-05 | End: 2023-05-08 | Stop reason: SDUPTHER

## 2023-05-05 RX ORDER — BLOOD-GLUCOSE METER
EACH MISCELLANEOUS
Qty: 1 KIT | Refills: 0 | Status: SHIPPED | OUTPATIENT
Start: 2023-05-05 | End: 2023-05-08

## 2023-05-08 ENCOUNTER — TELEPHONE (OUTPATIENT)
Dept: FAMILY MEDICINE CLINIC | Facility: CLINIC | Age: 73
End: 2023-05-08

## 2023-05-08 DIAGNOSIS — E11.43 TYPE 2 DIABETES MELLITUS WITH DIABETIC AUTONOMIC NEUROPATHY, WITHOUT LONG-TERM CURRENT USE OF INSULIN: Primary | ICD-10-CM

## 2023-05-08 RX ORDER — LANCETS
100 EACH MISCELLANEOUS DAILY
Qty: 100 EACH | Refills: 1 | Status: SHIPPED | OUTPATIENT
Start: 2023-05-08

## 2023-05-08 RX ORDER — BLOOD-GLUCOSE METER
1 EACH MISCELLANEOUS DAILY
Qty: 1 KIT | Refills: 0 | Status: SHIPPED | OUTPATIENT
Start: 2023-05-08

## 2023-05-08 RX ORDER — BLOOD-GLUCOSE METER
EACH MISCELLANEOUS
Qty: 1 KIT | Refills: 0 | Status: CANCELLED | OUTPATIENT
Start: 2023-05-08

## 2023-05-08 NOTE — TELEPHONE ENCOUNTER
Caller: Reyna Castano    Relationship: Self    Best call back number: [phone]    What medication are you requesting: ACCUCHECK GUIDE GLUCOSE METER AND TEST STRIPS    If a prescription is needed, what is your preferred pharmacy and phone number: [facility] - [facility] [address] - [phone]  - [phone] FX     Additional notes: PATIENT CALLED TO LET DR. CARROLL KNOW HER CURRENT METER HAS STOPPED WORKING AND SHE NEEDS A NEW PRESCRIPTION SENT TO [facility] FOR A NEW METER AND TEST STRIPS    PLEASE ADVISE

## 2023-05-29 DIAGNOSIS — E11.43 TYPE 2 DIABETES MELLITUS WITH DIABETIC AUTONOMIC NEUROPATHY, WITHOUT LONG-TERM CURRENT USE OF INSULIN: ICD-10-CM

## 2023-09-29 ENCOUNTER — OFFICE VISIT (OUTPATIENT)
Dept: FAMILY MEDICINE CLINIC | Facility: CLINIC | Age: 73
End: 2023-09-29
Payer: COMMERCIAL

## 2023-09-29 VITALS
WEIGHT: 214.5 LBS | TEMPERATURE: 98.1 F | HEIGHT: 67 IN | HEART RATE: 85 BPM | SYSTOLIC BLOOD PRESSURE: 131 MMHG | BODY MASS INDEX: 33.67 KG/M2 | DIASTOLIC BLOOD PRESSURE: 69 MMHG | OXYGEN SATURATION: 97 %

## 2023-09-29 DIAGNOSIS — Z23 NEED FOR VACCINATION: ICD-10-CM

## 2023-09-29 DIAGNOSIS — Z00.00 ENCOUNTER FOR GENERAL ADULT MEDICAL EXAMINATION WITHOUT ABNORMAL FINDINGS: Primary | ICD-10-CM

## 2023-09-29 DIAGNOSIS — J06.9 ACUTE URI: ICD-10-CM

## 2023-09-29 DIAGNOSIS — E11.43 TYPE 2 DIABETES MELLITUS WITH DIABETIC AUTONOMIC NEUROPATHY, WITHOUT LONG-TERM CURRENT USE OF INSULIN: ICD-10-CM

## 2023-09-29 LAB
ALBUMIN SERPL-MCNC: 4.1 G/DL (ref 3.5–5.2)
ALBUMIN/GLOB SERPL: 1.6 G/DL
ALP SERPL-CCNC: 83 U/L (ref 39–117)
ALT SERPL W P-5'-P-CCNC: 9 U/L (ref 1–33)
ANION GAP SERPL CALCULATED.3IONS-SCNC: 10 MMOL/L (ref 5–15)
AST SERPL-CCNC: 18 U/L (ref 1–32)
BILIRUB SERPL-MCNC: 0.3 MG/DL (ref 0–1.2)
BUN SERPL-MCNC: 17 MG/DL (ref 8–23)
BUN/CREAT SERPL: 17.7 (ref 7–25)
CALCIUM SPEC-SCNC: 9.9 MG/DL (ref 8.6–10.5)
CHLORIDE SERPL-SCNC: 105 MMOL/L (ref 98–107)
CHOLEST SERPL-MCNC: 126 MG/DL (ref 0–200)
CO2 SERPL-SCNC: 26 MMOL/L (ref 22–29)
CREAT SERPL-MCNC: 0.96 MG/DL (ref 0.57–1)
EGFRCR SERPLBLD CKD-EPI 2021: 62.2 ML/MIN/1.73
GLOBULIN UR ELPH-MCNC: 2.6 GM/DL
GLUCOSE SERPL-MCNC: 117 MG/DL (ref 65–99)
HBA1C MFR BLD: 6.3 % (ref 4.8–5.6)
HDLC SERPL-MCNC: 67 MG/DL (ref 40–60)
LDLC SERPL CALC-MCNC: 41 MG/DL (ref 0–100)
LDLC/HDLC SERPL: 0.59 {RATIO}
POTASSIUM SERPL-SCNC: 4.7 MMOL/L (ref 3.5–5.2)
PROT SERPL-MCNC: 6.7 G/DL (ref 6–8.5)
SODIUM SERPL-SCNC: 141 MMOL/L (ref 136–145)
TRIGL SERPL-MCNC: 97 MG/DL (ref 0–150)
VLDLC SERPL-MCNC: 18 MG/DL (ref 5–40)

## 2023-09-29 PROCEDURE — 80061 LIPID PANEL: CPT | Performed by: FAMILY MEDICINE

## 2023-09-29 PROCEDURE — 80053 COMPREHEN METABOLIC PANEL: CPT | Performed by: FAMILY MEDICINE

## 2023-09-29 PROCEDURE — 36415 COLL VENOUS BLD VENIPUNCTURE: CPT | Performed by: FAMILY MEDICINE

## 2023-09-29 PROCEDURE — 83036 HEMOGLOBIN GLYCOSYLATED A1C: CPT | Performed by: FAMILY MEDICINE

## 2023-09-29 RX ORDER — CEFDINIR 300 MG/1
300 CAPSULE ORAL 2 TIMES DAILY
Qty: 20 CAPSULE | Refills: 0 | Status: SHIPPED | OUTPATIENT
Start: 2023-09-29

## 2023-09-29 RX ORDER — ALBUTEROL SULFATE 90 UG/1
2 AEROSOL, METERED RESPIRATORY (INHALATION) EVERY 4 HOURS PRN
Qty: 18 G | Refills: 1 | Status: SHIPPED | OUTPATIENT
Start: 2023-09-29

## 2023-09-29 NOTE — PROGRESS NOTES
"Subjective   Reyna Castano is a 73 y.o. female and is here for a comprehensive physical exam. The patient reports no problems.    Do you take any herbs or supplements that were not prescribed by a doctor? no  Are you taking calcium supplements? no  Are you taking aspirin daily? no      The following portions of the patient's history were reviewed and updated as appropriate: allergies, current medications, past family history, past medical history, past social history, past surgical history, and problem list.    Review of Systems  Do you have pain that bothers you in your daily life? not asked  A comprehensive review of systems was negative.    Objective   /69   Pulse 85   Temp 98.1 °F (36.7 °C) (Infrared)   Ht 170.2 cm (67\")   Wt 97.3 kg (214 lb 8 oz)   SpO2 97%   BMI 33.60 kg/m²   General appearance: alert, appears stated age, and cooperative  Head: Normocephalic, without obvious abnormality, atraumatic  Eyes: conjunctivae/corneas clear. PERRL, EOM's intact. Fundi benign.  Lungs: wheezes bilaterally  Heart: regular rate and rhythm, S1, S2 normal, no murmur, click, rub or gallop  Extremities: extremities normal, atraumatic, no cyanosis or edema  Skin: Skin color, texture, turgor normal. No rashes or lesions  Lymph nodes: Cervical, supraclavicular, and axillary nodes normal.  Neurologic: Grossly normal     No visits with results within 1 Week(s) from this visit.   Latest known visit with results is:   Office Visit on 03/24/2023   Component Date Value Ref Range Status    Glucose 03/24/2023 111 (H)  65 - 99 mg/dL Final    BUN 03/24/2023 18  8 - 23 mg/dL Final    Creatinine 03/24/2023 0.91  0.57 - 1.00 mg/dL Final    Sodium 03/24/2023 141  136 - 145 mmol/L Final    Potassium 03/24/2023 4.2  3.5 - 5.2 mmol/L Final    Chloride 03/24/2023 103  98 - 107 mmol/L Final    CO2 03/24/2023 27.0  22.0 - 29.0 mmol/L Final    Calcium 03/24/2023 9.3  8.6 - 10.5 mg/dL Final    Total Protein 03/24/2023 7.0  6.0 - 8.5 g/dL " Final    Albumin 03/24/2023 4.2  3.5 - 5.2 g/dL Final    ALT (SGPT) 03/24/2023 10  1 - 33 U/L Final    AST (SGOT) 03/24/2023 18  1 - 32 U/L Final    Alkaline Phosphatase 03/24/2023 87  39 - 117 U/L Final    Total Bilirubin 03/24/2023 0.4  0.0 - 1.2 mg/dL Final    Globulin 03/24/2023 2.8  gm/dL Final    A/G Ratio 03/24/2023 1.5  g/dL Final    BUN/Creatinine Ratio 03/24/2023 19.8  7.0 - 25.0 Final    Anion Gap 03/24/2023 11.0  5.0 - 15.0 mmol/L Final    eGFR 03/24/2023 66.8  >60.0 mL/min/1.73 Final    Hemoglobin A1C 03/24/2023 6.10 (H)  4.80 - 5.60 % Final    Total Cholesterol 03/24/2023 144  0 - 200 mg/dL Final    Triglycerides 03/24/2023 137  0 - 150 mg/dL Final    HDL Cholesterol 03/24/2023 66 (H)  40 - 60 mg/dL Final    LDL Cholesterol  03/24/2023 55  0 - 100 mg/dL Final    VLDL Cholesterol 03/24/2023 23  5 - 40 mg/dL Final    LDL/HDL Ratio 03/24/2023 0.77   Final    Microalbumin/Creatinine Ratio 03/24/2023 468.5  mg/g Final    Creatinine, Urine 03/24/2023 95.2  mg/dL Final    Microalbumin, Urine 03/24/2023 44.6  mg/dL Final       Assessment & Plan   Healthy female exam.   Diagnoses and all orders for this visit:    1. Encounter for general adult medical examination without abnormal findings (Primary)    2. Acute URI  -     cefdinir (OMNICEF) 300 MG capsule; Take 1 capsule by mouth 2 (Two) Times a Day.  Dispense: 20 capsule; Refill: 0    3. Type 2 diabetes mellitus with diabetic autonomic neuropathy, without long-term current use of insulin  -     Comprehensive Metabolic Panel  -     Lipid Panel  -     Hemoglobin A1c    4. Need for vaccination  -     Fluzone High-Dose 65+yrs    Other orders  -     albuterol sulfate  (90 Base) MCG/ACT inhaler; Inhale 2 puffs Every 4 (Four) Hours As Needed for Wheezing or Shortness of Air.  Dispense: 18 g; Refill: 1      1. Well exam.  2. Patient Counseling:  --Nutrition: Stressed importance of moderation in sodium/caffeine intake, saturated fat and cholesterol, caloric  balance, sufficient intake of fresh fruits, vegetables, fiber, calcium, iron  --Exercise: Stressed the importance of regular exercise.   --Dental health: Discussed importance of regular tooth brushing, flossing, and dental visits.  --Immunizations reviewed.  --Discussed benefits of screening colonoscopy.    3. Discussed the patient's BMI with her.  The BMI is above average; BMI management plan is completed  4. Follow up in one year

## 2023-11-01 RX ORDER — CARVEDILOL 25 MG/1
25 TABLET ORAL 2 TIMES DAILY WITH MEALS
Qty: 180 TABLET | Refills: 3 | Status: SHIPPED | OUTPATIENT
Start: 2023-11-01

## 2023-12-01 ENCOUNTER — TELEPHONE (OUTPATIENT)
Dept: FAMILY MEDICINE CLINIC | Facility: CLINIC | Age: 73
End: 2023-12-01
Payer: MEDICARE

## 2023-12-01 NOTE — TELEPHONE ENCOUNTER
Reyna dropped off a letter stating they want her to change her rx of Tradjenta     Pt stated she does not want to and she has been on this for years     A PA was submitted I also kept her letter

## 2024-01-15 ENCOUNTER — TELEPHONE (OUTPATIENT)
Dept: FAMILY MEDICINE CLINIC | Facility: CLINIC | Age: 74
End: 2024-01-15
Payer: MEDICARE

## 2024-01-15 DIAGNOSIS — E11.43 TYPE 2 DIABETES MELLITUS WITH DIABETIC AUTONOMIC NEUROPATHY, WITHOUT LONG-TERM CURRENT USE OF INSULIN: ICD-10-CM

## 2024-01-15 RX ORDER — BLOOD SUGAR DIAGNOSTIC
STRIP MISCELLANEOUS
Qty: 100 EACH | Refills: 12 | Status: SHIPPED | OUTPATIENT
Start: 2024-01-15 | End: 2024-01-19

## 2024-01-19 DIAGNOSIS — E11.43 TYPE 2 DIABETES MELLITUS WITH DIABETIC AUTONOMIC NEUROPATHY, WITHOUT LONG-TERM CURRENT USE OF INSULIN: Primary | ICD-10-CM

## 2024-01-19 RX ORDER — PERPHENAZINE 16 MG/1
TABLET, FILM COATED ORAL
Qty: 100 EACH | Refills: 12 | Status: SHIPPED | OUTPATIENT
Start: 2024-01-19

## 2024-01-19 RX ORDER — BLOOD-GLUCOSE METER
1 EACH MISCELLANEOUS DAILY
Qty: 1 KIT | Refills: 0 | Status: SHIPPED | OUTPATIENT
Start: 2024-01-19

## 2024-01-30 ENCOUNTER — TELEPHONE (OUTPATIENT)
Dept: FAMILY MEDICINE CLINIC | Facility: CLINIC | Age: 74
End: 2024-01-30
Payer: MEDICARE

## 2024-01-30 RX ORDER — BLOOD-GLUCOSE METER
1 EACH MISCELLANEOUS DAILY
Qty: 1 KIT | Refills: 0 | Status: SHIPPED | OUTPATIENT
Start: 2024-01-30

## 2024-01-30 NOTE — TELEPHONE ENCOUNTER
Caller: Reyna Castano    Relationship: Self    Best call back number: [phone]     What medication are you requesting: CONTOUR NEXT EZ METER GLUCOMETER    If a prescription is needed, what is your preferred pharmacy and phone number: [facility] [address] [phone] [facility] [phone] FX     Additional notes: PATIENT STATES THAT SHE HAS TO HAVE A NEW BLOOD GLUCOSE MONITOR DUE TO INSURANCE COVERAGE FOR SUPPLIES

## 2024-03-29 ENCOUNTER — OFFICE VISIT (OUTPATIENT)
Dept: FAMILY MEDICINE CLINIC | Facility: CLINIC | Age: 74
End: 2024-03-29
Payer: MEDICARE

## 2024-03-29 ENCOUNTER — LAB (OUTPATIENT)
Dept: FAMILY MEDICINE CLINIC | Facility: CLINIC | Age: 74
End: 2024-03-29
Payer: MEDICARE

## 2024-03-29 VITALS
OXYGEN SATURATION: 95 % | HEIGHT: 67 IN | WEIGHT: 205.6 LBS | DIASTOLIC BLOOD PRESSURE: 70 MMHG | TEMPERATURE: 98.2 F | SYSTOLIC BLOOD PRESSURE: 135 MMHG | HEART RATE: 86 BPM | BODY MASS INDEX: 32.27 KG/M2

## 2024-03-29 DIAGNOSIS — I10 PRIMARY HYPERTENSION: ICD-10-CM

## 2024-03-29 DIAGNOSIS — E11.43 TYPE 2 DIABETES MELLITUS WITH DIABETIC AUTONOMIC NEUROPATHY, WITHOUT LONG-TERM CURRENT USE OF INSULIN: ICD-10-CM

## 2024-03-29 DIAGNOSIS — Z00.00 MEDICARE ANNUAL WELLNESS VISIT, SUBSEQUENT: Primary | ICD-10-CM

## 2024-03-29 PROBLEM — R05.9 COUGH IN ADULT: Status: RESOLVED | Noted: 2022-12-20 | Resolved: 2024-03-29

## 2024-03-29 LAB
ALBUMIN SERPL-MCNC: 4.2 G/DL (ref 3.5–5.2)
ALBUMIN UR-MCNC: 17.5 MG/DL
ALBUMIN/GLOB SERPL: 1.7 G/DL
ALP SERPL-CCNC: 73 U/L (ref 39–117)
ALT SERPL W P-5'-P-CCNC: 11 U/L (ref 1–33)
ANION GAP SERPL CALCULATED.3IONS-SCNC: 12.7 MMOL/L (ref 5–15)
AST SERPL-CCNC: 18 U/L (ref 1–32)
BILIRUB SERPL-MCNC: 0.5 MG/DL (ref 0–1.2)
BUN SERPL-MCNC: 12 MG/DL (ref 8–23)
BUN/CREAT SERPL: 14.3 (ref 7–25)
CALCIUM SPEC-SCNC: 9.6 MG/DL (ref 8.6–10.5)
CHLORIDE SERPL-SCNC: 107 MMOL/L (ref 98–107)
CHOLEST SERPL-MCNC: 132 MG/DL (ref 0–200)
CO2 SERPL-SCNC: 24.3 MMOL/L (ref 22–29)
CREAT SERPL-MCNC: 0.84 MG/DL (ref 0.57–1)
CREAT UR-MCNC: 19.4 MG/DL
EGFRCR SERPLBLD CKD-EPI 2021: 73 ML/MIN/1.73
GLOBULIN UR ELPH-MCNC: 2.5 GM/DL
GLUCOSE SERPL-MCNC: 114 MG/DL (ref 65–99)
HBA1C MFR BLD: 5.7 % (ref 4.8–5.6)
HDLC SERPL-MCNC: 74 MG/DL (ref 40–60)
LDLC SERPL CALC-MCNC: 42 MG/DL (ref 0–100)
LDLC/HDLC SERPL: 0.55 {RATIO}
MICROALBUMIN/CREAT UR: 902.1 MG/G (ref 0–29)
POTASSIUM SERPL-SCNC: 4.2 MMOL/L (ref 3.5–5.2)
PROT SERPL-MCNC: 6.7 G/DL (ref 6–8.5)
SODIUM SERPL-SCNC: 144 MMOL/L (ref 136–145)
TRIGL SERPL-MCNC: 86 MG/DL (ref 0–150)
VLDLC SERPL-MCNC: 16 MG/DL (ref 5–40)

## 2024-03-29 PROCEDURE — 80053 COMPREHEN METABOLIC PANEL: CPT | Performed by: FAMILY MEDICINE

## 2024-03-29 PROCEDURE — 82570 ASSAY OF URINE CREATININE: CPT | Performed by: FAMILY MEDICINE

## 2024-03-29 PROCEDURE — 83036 HEMOGLOBIN GLYCOSYLATED A1C: CPT | Performed by: FAMILY MEDICINE

## 2024-03-29 PROCEDURE — 36415 COLL VENOUS BLD VENIPUNCTURE: CPT | Performed by: FAMILY MEDICINE

## 2024-03-29 PROCEDURE — 82043 UR ALBUMIN QUANTITATIVE: CPT | Performed by: FAMILY MEDICINE

## 2024-03-29 PROCEDURE — 80061 LIPID PANEL: CPT | Performed by: FAMILY MEDICINE

## 2024-03-29 RX ORDER — ATORVASTATIN CALCIUM 10 MG/1
10 TABLET, FILM COATED ORAL NIGHTLY
Start: 2024-03-29

## 2024-03-29 NOTE — PROGRESS NOTES
The ABCs of the Annual Wellness Visit  Subsequent Medicare Wellness Visit    Subjective    Reyna Castano is a 73 y.o. female who presents for a Subsequent Medicare Wellness Visit.    The following portions of the patient's history were reviewed and   updated as appropriate: allergies, current medications, past family history, past medical history, past social history, past surgical history, and problem list.    Compared to one year ago, the patient feels her physical   health is the same.    Compared to one year ago, the patient feels her mental   health is the same.    Recent Hospitalizations:  She was not admitted to the hospital during the last year.       Current Medical Providers:  Patient Care Team:  Keturah Grewal MD as PCP - General (Family Medicine)  Toi Alejo MD as Consulting Physician (Ophthalmology)    Outpatient Medications Prior to Visit   Medication Sig Dispense Refill    Accu-Chek Softclix Lancets lancets 100 each by Other route Daily. Use as instructed 100 each 1    Blood Glucose Monitoring Suppl (Contour Next Monitor) w/Device kit Use 1 Device Daily. E11.9 1 kit 0    Blood Glucose Monitoring Suppl (Contour Next Monitor) w/Device kit Use 1 Device Daily. 1 kit 0    carvedilol (COREG) 25 MG tablet TAKE 1 TABLET BY MOUTH TWICE  DAILY WITH MEALS 180 tablet 3    glucose blood (Contour Next Test) test strip Use to check blood sugar once daily.  E11.9 100 each 12    metFORMIN (GLUCOPHAGE) 500 MG tablet TAKE 1 TABLET TWICE DAILY 180 tablet 3    Tradjenta 5 MG tablet tablet TAKE 1 TABLET EVERY DAY FOR DIABETES 90 tablet 3    atorvastatin (LIPITOR) 10 MG tablet TAKE 1 TABLET EVERY NIGHT FOR HIGH CHOLESTEROL 90 tablet 3    albuterol sulfate  (90 Base) MCG/ACT inhaler Inhale 2 puffs Every 4 (Four) Hours As Needed for Wheezing or Shortness of Air. (Patient not taking: Reported on 3/29/2024) 18 g 1    cefdinir (OMNICEF) 300 MG capsule Take 1 capsule by mouth 2 (Two) Times a Day. (Patient not  "taking: Reported on 3/29/2024) 20 capsule 0     No facility-administered medications prior to visit.       No opioid medication identified on active medication list. I have reviewed chart for other potential  high risk medication/s and harmful drug interactions in the elderly.        Aspirin is not on active medication list.  Aspirin use is not indicated based on review of current medical condition/s. Risk of harm outweighs potential benefits.  .    Patient Active Problem List   Diagnosis    Disorder associated with type 2 diabetes mellitus    Encounter for general adult medical examination without abnormal findings    Gastroesophageal reflux disease    Hypertension    Hypokalemia    Acute non-recurrent maxillary sinusitis    Retrolisthesis of vertebrae    Neural foraminal stenosis of lumbar spine    Morbidly obese    Anemia due to acute blood loss    Hay fever    Hematochezia    Encounter for immunization    Degenerative disc disease, lumbar    Encounter for screening mammogram for breast cancer    Need for vaccination    Medicare annual wellness visit, subsequent    COVID-19 virus infection    Acute URI    Type 2 diabetes mellitus with diabetic autonomic neuropathy, without long-term current use of insulin     Advance Care Planning   Advance Care Planning     Advance Directive is not on file.  ACP discussion was held with the patient during this visit. Patient does not have an advance directive, information provided.     Objective    Vitals:    03/29/24 0800 03/29/24 0807   BP: (!) 191/105 135/70   BP Location: Left arm Left arm   Patient Position: Sitting Sitting   Cuff Size: Large Adult    Pulse: 86    Temp: 98.2 °F (36.8 °C)    TempSrc: Infrared    SpO2: 95%    Weight: 93.3 kg (205 lb 9.6 oz)    Height: 170.2 cm (67\")      Estimated body mass index is 32.2 kg/m² as calculated from the following:    Height as of this encounter: 170.2 cm (67\").    Weight as of this encounter: 93.3 kg (205 lb 9.6 oz).     "       Does the patient have evidence of cognitive impairment? No          HEALTH RISK ASSESSMENT    Smoking Status:  Social History     Tobacco Use   Smoking Status Never    Passive exposure: Never   Smokeless Tobacco Never     Alcohol Consumption:  Social History     Substance and Sexual Activity   Alcohol Use No    Comment: caffeine free drinks     Fall Risk Screen:    NOE Fall Risk Assessment was completed, and patient is at LOW risk for falls.Assessment completed on:3/29/2024    Depression Screening:      3/29/2024     8:10 AM   PHQ-2/PHQ-9 Depression Screening   Little Interest or Pleasure in Doing Things 0-->not at all   Feeling Down, Depressed or Hopeless 0-->not at all   PHQ-9: Brief Depression Severity Measure Score 0       Health Habits and Functional and Cognitive Screening:      3/29/2024     8:00 AM   Functional & Cognitive Status   Do you have difficulty preparing food and eating? No   Do you have difficulty bathing yourself, getting dressed or grooming yourself? No   Do you have difficulty using the toilet? No   Do you have difficulty moving around from place to place? No   Do you have trouble with steps or getting out of a bed or a chair? No   Current Diet Well Balanced Diet   Dental Exam Up to date   Eye Exam Up to date   Do you need help using the phone?  No   Are you deaf or do you have serious difficulty hearing?  No   Do you need help to go to places out of walking distance? No   Do you need help shopping? No   Do you need help preparing meals?  No   Do you need help with housework?  No   Do you need help with laundry? No   Do you need help taking your medications? No   Do you ever drive or ride in a car without wearing a seat belt? No   Have you felt unusual stress, anger or loneliness in the last month? No   Who do you live with? Spouse   If you need help, do you have trouble finding someone available to you? No   Do you have difficulty concentrating, remembering or making decisions? No        Age-appropriate Screening Schedule:  Refer to the list below for future screening recommendations based on patient's age, sex and/or medical conditions. Orders for these recommended tests are listed in the plan section. The patient has been provided with a written plan.    Health Maintenance   Topic Date Due    DXA SCAN  Never done    TDAP/TD VACCINES (1 - Tdap) Never done    ZOSTER VACCINE (1 of 2) Never done    RSV Vaccine - Adults (1 - 1-dose 60+ series) Never done    Pneumococcal Vaccine 65+ (2 of 2 - PPSV23 or PCV20) 04/16/2016    DIABETIC FOOT EXAM  Never done    COVID-19 Vaccine (4 - 2023-24 season) 09/01/2023    URINE MICROALBUMIN  03/24/2024    HEMOGLOBIN A1C  03/29/2024    MAMMOGRAM  03/29/2025 (Originally 1950)    BMI FOLLOWUP  09/29/2024    DIABETIC EYE EXAM  01/11/2025    ANNUAL WELLNESS VISIT  03/29/2025    COLORECTAL CANCER SCREENING  02/20/2026    HEPATITIS C SCREENING  Completed    INFLUENZA VACCINE  Completed                  CMS Preventative Services Quick Reference  Risk Factors Identified During Encounter  Inactivity/Sedentary: Patient was advised to exercise at least 150 minutes a week per CDC recommendations.  Dental Screening Recommended  Vision Screening Recommended  The above risks/problems have been discussed with the patient.  Pertinent information has been shared with the patient in the After Visit Summary.  An After Visit Summary and PPPS were made available to the patient.    Follow Up:   Next Medicare Wellness visit to be scheduled in 1 year.       Additional E&M Note during same encounter follows:  Patient has multiple medical problems which are significant and separately identifiable that require additional work above and beyond the Medicare Wellness Visit.      Chief Complaint  Diabetes, Hypertension, and wellness    Subjective        Hypertension  This is a chronic problem. The current episode started more than 1 year ago. The problem is unchanged. The problem is  "controlled. Pertinent negatives include no anxiety, chest pain, malaise/fatigue, palpitations, peripheral edema or shortness of breath. There are no associated agents to hypertension. The current treatment provides moderate improvement. There are no compliance problems.    Diabetes  She presents for her follow-up diabetic visit. She has type 2 diabetes mellitus. Her disease course has been stable. Associated symptoms include fatigue and visual change. Pertinent negatives for diabetes include no chest pain, no foot ulcerations, no polydipsia, no polyphagia, no polyuria and no weight loss. Symptoms are stable. She is compliant with treatment most of the time. Her weight is stable. She is following a diabetic diet. Meal planning includes low carbohydrate diet. She participates in exercise intermittently. There is no change in her home blood glucose trend. Her overall blood glucose range is  mg/dl.  Eye exam is current.     Reyna Castano is also being seen today for HTN, DM    Review of Systems   Constitutional:  Positive for fatigue. Negative for malaise/fatigue and unexpected weight loss.   Respiratory:  Negative for shortness of breath.    Cardiovascular:  Negative for chest pain and palpitations.   Endocrine: Negative for polydipsia, polyphagia and polyuria.   All other systems reviewed and are negative.      Objective   Vital Signs:  /70 (BP Location: Left arm, Patient Position: Sitting)   Pulse 86   Temp 98.2 °F (36.8 °C) (Infrared)   Ht 170.2 cm (67\")   Wt 93.3 kg (205 lb 9.6 oz)   SpO2 95%   BMI 32.20 kg/m²     Physical Exam  Vitals and nursing note reviewed.   Constitutional:       General: She is not in acute distress.     Appearance: She is well-developed.   HENT:      Head: Normocephalic.   Eyes:      General: Lids are normal.      Conjunctiva/sclera: Conjunctivae normal.   Neck:      Thyroid: No thyroid mass or thyromegaly.      Trachea: Trachea normal.   Cardiovascular:      Rate and " Rhythm: Normal rate and regular rhythm.      Heart sounds: Normal heart sounds.   Pulmonary:      Effort: Pulmonary effort is normal.      Breath sounds: Normal breath sounds.   Musculoskeletal:      Cervical back: Normal range of motion.      Right lower leg: No edema.      Left lower leg: No edema.   Lymphadenopathy:      Cervical: No cervical adenopathy.   Skin:     General: Skin is warm and dry.   Neurological:      Mental Status: She is alert and oriented to person, place, and time.   Psychiatric:         Attention and Perception: She is attentive.         Mood and Affect: Mood normal.         Speech: Speech normal.         Behavior: Behavior normal.          The following data was reviewed by: Keturah Grewal MD on 03/29/2024:  Common labs          9/29/2023    08:35   Common Labs   Glucose 117    BUN 17    Creatinine 0.96    Sodium 141    Potassium 4.7    Chloride 105    Calcium 9.9    Albumin 4.1    Total Bilirubin 0.3    Alkaline Phosphatase 83    AST (SGOT) 18    ALT (SGPT) 9    Total Cholesterol 126    Triglycerides 97    HDL Cholesterol 67    LDL Cholesterol  41    Hemoglobin A1C 6.30                 Assessment and Plan   Diagnoses and all orders for this visit:    1. Medicare annual wellness visit, subsequent (Primary)    2. Type 2 diabetes mellitus with diabetic autonomic neuropathy, without long-term current use of insulin  Assessment & Plan:  Diabetes is stable.   Continue current treatment regimen.  Diabetes will be reassessed in 6 months    Orders:  -     Hemoglobin A1c  -     Comprehensive Metabolic Panel  -     Lipid Panel  -     Microalbumin / Creatinine Urine Ratio - Urine, Clean Catch    3. Primary hypertension  Assessment & Plan:  Hypertension is stable and controlled  Continue current treatment regimen.  Blood pressure will be reassessed in 6 months.      Other orders  -     atorvastatin (LIPITOR) 10 MG tablet; Take 1 tablet by mouth Every Night.             Follow Up   No follow-ups on  file.  Patient was given instructions and counseling regarding her condition or for health maintenance advice. Please see specific information pulled into the AVS if appropriate.

## 2024-04-02 ENCOUNTER — TELEPHONE (OUTPATIENT)
Dept: FAMILY MEDICINE CLINIC | Facility: CLINIC | Age: 74
End: 2024-04-02

## 2024-04-02 NOTE — TELEPHONE ENCOUNTER
Caller: Reyna Castano    Relationship: Self    Best call back number: [phone]    What was the call regarding: PATIENT WOULD LIKE HER RECENT LAB WORK MAILED TO HER HOME ADDRESS.

## 2024-07-13 RX ORDER — ATORVASTATIN CALCIUM 10 MG/1
TABLET, FILM COATED ORAL
Qty: 90 TABLET | Refills: 3 | Status: SHIPPED | OUTPATIENT
Start: 2024-07-13

## 2024-07-13 RX ORDER — LINAGLIPTIN 5 MG/1
TABLET, FILM COATED ORAL
Qty: 90 TABLET | Refills: 3 | Status: SHIPPED | OUTPATIENT
Start: 2024-07-13

## 2024-10-04 ENCOUNTER — OFFICE VISIT (OUTPATIENT)
Dept: FAMILY MEDICINE CLINIC | Facility: CLINIC | Age: 74
End: 2024-10-04
Payer: MEDICARE

## 2024-10-04 VITALS
HEART RATE: 77 BPM | OXYGEN SATURATION: 95 % | BODY MASS INDEX: 31.44 KG/M2 | SYSTOLIC BLOOD PRESSURE: 130 MMHG | TEMPERATURE: 98.5 F | HEIGHT: 67 IN | DIASTOLIC BLOOD PRESSURE: 60 MMHG | WEIGHT: 200.3 LBS

## 2024-10-04 DIAGNOSIS — E11.43 TYPE 2 DIABETES MELLITUS WITH DIABETIC AUTONOMIC NEUROPATHY, WITHOUT LONG-TERM CURRENT USE OF INSULIN: Primary | ICD-10-CM

## 2024-10-04 DIAGNOSIS — R06.2 WHEEZING: ICD-10-CM

## 2024-10-04 DIAGNOSIS — I10 PRIMARY HYPERTENSION: ICD-10-CM

## 2024-10-04 DIAGNOSIS — Z23 NEED FOR VACCINATION: ICD-10-CM

## 2024-10-04 LAB
ALBUMIN SERPL-MCNC: 4.2 G/DL (ref 3.5–5.2)
ALBUMIN/GLOB SERPL: 1.7 G/DL
ALP SERPL-CCNC: 81 U/L (ref 39–117)
ALT SERPL W P-5'-P-CCNC: 10 U/L (ref 1–33)
ANION GAP SERPL CALCULATED.3IONS-SCNC: 13 MMOL/L (ref 5–15)
AST SERPL-CCNC: 14 U/L (ref 1–32)
BILIRUB SERPL-MCNC: 0.5 MG/DL (ref 0–1.2)
BUN SERPL-MCNC: 13 MG/DL (ref 8–23)
BUN/CREAT SERPL: 15.1 (ref 7–25)
CALCIUM SPEC-SCNC: 9.8 MG/DL (ref 8.6–10.5)
CHLORIDE SERPL-SCNC: 106 MMOL/L (ref 98–107)
CHOLEST SERPL-MCNC: 131 MG/DL (ref 0–200)
CO2 SERPL-SCNC: 26 MMOL/L (ref 22–29)
CREAT SERPL-MCNC: 0.86 MG/DL (ref 0.57–1)
EGFRCR SERPLBLD CKD-EPI 2021: 70.6 ML/MIN/1.73
GLOBULIN UR ELPH-MCNC: 2.5 GM/DL
GLUCOSE SERPL-MCNC: 123 MG/DL (ref 65–99)
HBA1C MFR BLD: 6 % (ref 4.8–5.6)
HDLC SERPL-MCNC: 73 MG/DL (ref 40–60)
LDLC SERPL CALC-MCNC: 43 MG/DL (ref 0–100)
LDLC/HDLC SERPL: 0.58 {RATIO}
POTASSIUM SERPL-SCNC: 4.7 MMOL/L (ref 3.5–5.2)
PROT SERPL-MCNC: 6.7 G/DL (ref 6–8.5)
SODIUM SERPL-SCNC: 145 MMOL/L (ref 136–145)
TRIGL SERPL-MCNC: 79 MG/DL (ref 0–150)
VLDLC SERPL-MCNC: 15 MG/DL (ref 5–40)

## 2024-10-04 PROCEDURE — 36415 COLL VENOUS BLD VENIPUNCTURE: CPT | Performed by: FAMILY MEDICINE

## 2024-10-04 PROCEDURE — 80053 COMPREHEN METABOLIC PANEL: CPT | Performed by: FAMILY MEDICINE

## 2024-10-04 PROCEDURE — 83036 HEMOGLOBIN GLYCOSYLATED A1C: CPT | Performed by: FAMILY MEDICINE

## 2024-10-04 PROCEDURE — 80061 LIPID PANEL: CPT | Performed by: FAMILY MEDICINE

## 2024-10-04 RX ORDER — ALBUTEROL SULFATE 90 UG/1
2 INHALANT RESPIRATORY (INHALATION) EVERY 4 HOURS PRN
Qty: 18 G | Refills: 1 | Status: SHIPPED | OUTPATIENT
Start: 2024-10-04

## 2024-10-04 NOTE — PROGRESS NOTES
"Chief Complaint  Diabetes and Hypertension (6 month f/u )    Subjective        Reyna Castano presents to [facility]  Diabetes  She presents for her follow-up diabetic visit. She has type 2 diabetes mellitus. Her disease course has been stable. Pertinent negatives for hypoglycemia include no confusion or dizziness. Pertinent negatives for diabetes include no blurred vision, no chest pain, no fatigue, no foot paresthesias, no foot ulcerations, no polydipsia, no polyphagia and no polyuria. Pertinent negatives for hypoglycemia complications include no hospitalization. Symptoms are stable. Current diabetic treatment includes diet and oral agent (dual therapy). She is compliant with treatment most of the time. Her weight is decreasing steadily. She is following a diabetic diet. Meal planning includes low carbohydrate diet. She participates in exercise intermittently. There is no change in her home blood glucose trend. Her highest blood glucose is  mg/dl. Her overall blood glucose range is  mg/dl. Her blood sugar drops below 70 never.  Hypertension  This is a chronic problem. The current episode started more than 1 year ago. The problem is unchanged. The problem is controlled. Pertinent negatives include no blurred vision, chest pain, neck pain, orthopnea or palpitations. There are no associated agents to hypertension. Current antihypertension treatment includes beta blockers. The current treatment provides moderate improvement. There are no compliance problems.        Objective   Vital Signs:  /60 Comment: at home this morning  Pulse 77   Temp 98.5 °F (36.9 °C) (Infrared)   Ht 170.2 cm (67\")   Wt 90.9 kg (200 lb 4.8 oz)   SpO2 95%   BMI 31.37 kg/m²   Estimated body mass index is 31.37 kg/m² as calculated from the following:    Height as of this encounter: 170.2 cm (67\").    Weight as of this encounter: 90.9 kg (200 lb 4.8 oz).    BMI is >= 30 and <35. (Class 1 " Obesity). The following options were offered after discussion;: exercise counseling/recommendations      Physical Exam  Vitals and nursing note reviewed.   Constitutional:       General: She is not in acute distress.     Appearance: She is well-developed.   HENT:      Head: Normocephalic.   Eyes:      General: Lids are normal.      Conjunctiva/sclera: Conjunctivae normal.   Neck:      Thyroid: No thyroid mass or thyromegaly.      Trachea: Trachea normal.   Cardiovascular:      Rate and Rhythm: Normal rate and regular rhythm.      Heart sounds: Normal heart sounds.   Pulmonary:      Effort: Pulmonary effort is normal.      Breath sounds: Wheezing present.   Musculoskeletal:      Cervical back: Normal range of motion.      Right lower leg: No edema.      Left lower leg: No edema.   Lymphadenopathy:      Cervical: No cervical adenopathy.   Skin:     General: Skin is warm and dry.   Neurological:      Mental Status: She is alert and oriented to person, place, and time.   Psychiatric:         Attention and Perception: She is attentive.         Mood and Affect: Mood normal.         Speech: Speech normal.         Behavior: Behavior normal.        Result Review :  The following data was reviewed by: Keturah Grewal MD on 10/04/2024:  Common labs          3/29/2024    08:29 3/29/2024    09:22   Common Labs   Glucose 114     BUN 12     Creatinine 0.84     Sodium 144     Potassium 4.2     Chloride 107     Calcium 9.6     Albumin 4.2     Total Bilirubin 0.5     Alkaline Phosphatase 73     AST (SGOT) 18     ALT (SGPT) 11     Total Cholesterol 132     Triglycerides 86     HDL Cholesterol 74     LDL Cholesterol  42     Hemoglobin A1C 5.70     Microalbumin, Urine  17.5                Assessment and Plan   Diagnoses and all orders for this visit:    1. Type 2 diabetes mellitus with diabetic autonomic neuropathy, without long-term current use of insulin (Primary)  Assessment & Plan:  Diabetes is stable.   Continue current treatment  regimen.  Diabetes will be reassessed in 6 months    Orders:  -     Lipid Panel  -     Comprehensive Metabolic Panel  -     Hemoglobin A1c    2. Primary hypertension  Assessment & Plan:  Hypertension is stable and controlled  Continue current treatment regimen.  Blood pressure will be reassessed in 6 months.      3. Wheezing  -     albuterol sulfate  (90 Base) MCG/ACT inhaler; Inhale 2 puffs Every 4 (Four) Hours As Needed for Wheezing or Shortness of Air.  Dispense: 18 g; Refill: 1    4. Need for vaccination  -     Fluzone High-Dose 65+yrs ([id number])             Follow Up   No follow-ups on file.  Patient was given instructions and counseling regarding her condition or for health maintenance advice. Please see specific information pulled into the AVS if appropriate.

## 2024-10-11 ENCOUNTER — TELEPHONE (OUTPATIENT)
Dept: FAMILY MEDICINE CLINIC | Facility: CLINIC | Age: 74
End: 2024-10-11

## 2024-10-11 RX ORDER — CARVEDILOL 25 MG/1
25 TABLET ORAL 2 TIMES DAILY WITH MEALS
Qty: 180 TABLET | Refills: 3 | Status: SHIPPED | OUTPATIENT
Start: 2024-10-11

## 2024-10-11 NOTE — TELEPHONE ENCOUNTER
I have printed out her form to be completed and I also printed out her results to be mailed to her

## 2024-10-11 NOTE — TELEPHONE ENCOUNTER
Caller: Reyna Castano    Relationship: Self    Best call back number: [phone]     What is the best time to reach you: ASAP TODAY 10-    What was the call regarding: PATIENT IS REQUESTING TO BE CALLED WITH THE STATUS OF THIS REQUEST ASAP TODAY 10-.    PATIENT STATED THIS IS TO BE REFAXED WITH THE GLUCOSE LEVEL LISTED. PATIENT STATED THE FAX NUMBER IS LISTED ON THE FORM.    PLEASE CALL TO ADVISE.

## 2024-10-11 NOTE — TELEPHONE ENCOUNTER
Caller: Eyad Reyna M    Relationship: Self    Best call back number: [phone]     What is the best time to reach you: ANY    Who are you requesting to speak with (clinical staff, provider,  specific staff member): PCP    Do you know the name of the person who called:     What was the call regarding: PAPERWORK WAS SENT TO [facility] IN REGARDS TO THE PATIENT'S MOST RECENT LABS. THE PAPERWORK IS MISSING HER GLUCOSE LAB RESULTS. THE PATIENT WOULD LIKE TO GET THIS SENT TO [facility] AS SOON AS POSSIBLE. THE PATIENT WOULD ALSO LIKE HER MOST RECENT LAB RESULTS MAILED TO HER HOME ADDRESS.    Is it okay if the provider responds through [employer]:

## 2025-06-24 DIAGNOSIS — E11.43 TYPE 2 DIABETES MELLITUS WITH DIABETIC AUTONOMIC NEUROPATHY, WITHOUT LONG-TERM CURRENT USE OF INSULIN: Primary | ICD-10-CM

## 2025-07-02 ENCOUNTER — LAB (OUTPATIENT)
Dept: FAMILY MEDICINE CLINIC | Facility: CLINIC | Age: 75
End: 2025-07-02
Payer: MEDICARE

## 2025-07-02 ENCOUNTER — OFFICE VISIT (OUTPATIENT)
Dept: FAMILY MEDICINE CLINIC | Facility: CLINIC | Age: 75
End: 2025-07-02
Payer: MEDICARE

## 2025-07-02 VITALS
DIASTOLIC BLOOD PRESSURE: 81 MMHG | BODY MASS INDEX: 30.32 KG/M2 | HEART RATE: 71 BPM | SYSTOLIC BLOOD PRESSURE: 148 MMHG | OXYGEN SATURATION: 89 % | HEIGHT: 67 IN | WEIGHT: 193.2 LBS | TEMPERATURE: 98.1 F

## 2025-07-02 DIAGNOSIS — I10 PRIMARY HYPERTENSION: ICD-10-CM

## 2025-07-02 DIAGNOSIS — E11.43 TYPE 2 DIABETES MELLITUS WITH DIABETIC AUTONOMIC NEUROPATHY, WITHOUT LONG-TERM CURRENT USE OF INSULIN: ICD-10-CM

## 2025-07-02 DIAGNOSIS — Z78.0 POSTMENOPAUSE: ICD-10-CM

## 2025-07-02 DIAGNOSIS — Z00.00 MEDICARE ANNUAL WELLNESS VISIT, SUBSEQUENT: Primary | ICD-10-CM

## 2025-07-02 DIAGNOSIS — R06.2 WHEEZING: ICD-10-CM

## 2025-07-02 LAB
ALBUMIN SERPL-MCNC: 4.3 G/DL (ref 3.5–5.2)
ALBUMIN UR-MCNC: 13.9 MG/DL
ALBUMIN/GLOB SERPL: 1.4 G/DL
ALP SERPL-CCNC: 88 U/L (ref 39–117)
ALT SERPL W P-5'-P-CCNC: 11 U/L (ref 1–33)
ANION GAP SERPL CALCULATED.3IONS-SCNC: 14 MMOL/L (ref 5–15)
AST SERPL-CCNC: 20 U/L (ref 1–32)
BILIRUB SERPL-MCNC: 0.5 MG/DL (ref 0–1.2)
BUN SERPL-MCNC: 15 MG/DL (ref 8–23)
BUN/CREAT SERPL: 14.7 (ref 7–25)
CALCIUM SPEC-SCNC: 9.9 MG/DL (ref 8.6–10.5)
CHLORIDE SERPL-SCNC: 101 MMOL/L (ref 98–107)
CHOLEST SERPL-MCNC: 142 MG/DL (ref 0–200)
CO2 SERPL-SCNC: 25 MMOL/L (ref 22–29)
CREAT SERPL-MCNC: 1.02 MG/DL (ref 0.57–1)
CREAT UR-MCNC: 43.2 MG/DL
EGFRCR SERPLBLD CKD-EPI 2021: 57.5 ML/MIN/1.73
GLOBULIN UR ELPH-MCNC: 3 GM/DL
GLUCOSE SERPL-MCNC: 118 MG/DL (ref 65–99)
HBA1C MFR BLD: 6.2 % (ref 4.8–5.6)
HDLC SERPL-MCNC: 79 MG/DL (ref 40–60)
LDLC SERPL CALC-MCNC: 47 MG/DL (ref 0–100)
LDLC/HDLC SERPL: 0.58 {RATIO}
MICROALBUMIN/CREAT UR: 321.8 MG/G (ref 0–29)
POTASSIUM SERPL-SCNC: 4.3 MMOL/L (ref 3.5–5.2)
PROT SERPL-MCNC: 7.3 G/DL (ref 6–8.5)
SODIUM SERPL-SCNC: 140 MMOL/L (ref 136–145)
TRIGL SERPL-MCNC: 84 MG/DL (ref 0–150)
VLDLC SERPL-MCNC: 16 MG/DL (ref 5–40)

## 2025-07-02 PROCEDURE — 80053 COMPREHEN METABOLIC PANEL: CPT | Performed by: FAMILY MEDICINE

## 2025-07-02 PROCEDURE — 80061 LIPID PANEL: CPT | Performed by: FAMILY MEDICINE

## 2025-07-02 PROCEDURE — 82043 UR ALBUMIN QUANTITATIVE: CPT | Performed by: FAMILY MEDICINE

## 2025-07-02 PROCEDURE — 83036 HEMOGLOBIN GLYCOSYLATED A1C: CPT | Performed by: FAMILY MEDICINE

## 2025-07-02 PROCEDURE — 82570 ASSAY OF URINE CREATININE: CPT | Performed by: FAMILY MEDICINE

## 2025-07-02 RX ORDER — CARVEDILOL 25 MG/1
25 TABLET ORAL 2 TIMES DAILY WITH MEALS
Qty: 180 TABLET | Refills: 3 | Status: SHIPPED | OUTPATIENT
Start: 2025-07-02

## 2025-07-02 RX ORDER — BLOOD SUGAR DIAGNOSTIC
STRIP MISCELLANEOUS
Qty: 100 EACH | Refills: 3 | Status: SHIPPED | OUTPATIENT
Start: 2025-07-02

## 2025-07-02 RX ORDER — ALBUTEROL SULFATE 90 UG/1
2 INHALANT RESPIRATORY (INHALATION) EVERY 4 HOURS PRN
Qty: 18 G | Refills: 1 | Status: SHIPPED | OUTPATIENT
Start: 2025-07-02

## 2025-07-02 RX ORDER — ATORVASTATIN CALCIUM 10 MG/1
10 TABLET, FILM COATED ORAL NIGHTLY
Qty: 90 TABLET | Refills: 3 | Status: SHIPPED | OUTPATIENT
Start: 2025-07-02

## 2025-07-02 RX ORDER — LANCETS 23 GAUGE
EACH MISCELLANEOUS
Qty: 100 EACH | Refills: 3 | Status: SHIPPED | OUTPATIENT
Start: 2025-07-02

## 2025-07-02 RX ORDER — BLOOD-GLUCOSE METER
1 EACH MISCELLANEOUS DAILY
Qty: 1 KIT | Refills: 0 | Status: SHIPPED | OUTPATIENT
Start: 2025-07-02

## 2025-07-02 NOTE — ASSESSMENT & PLAN NOTE
Hypertension is uncontrolled  Continue current treatment regimen.  Regular aerobic exercise.  Ambulatory blood pressure monitoring.  Blood pressure will be reassessedat the next regular appointment.

## 2025-07-02 NOTE — ASSESSMENT & PLAN NOTE
Orders:    albuterol sulfate  (90 Base) MCG/ACT inhaler; Inhale 2 puffs Every 4 (Four) Hours As Needed for Wheezing or Shortness of Air.

## 2025-07-02 NOTE — PROGRESS NOTES
" Subjective   The ABCs of the Annual Wellness Visit  Medicare Wellness Visit      Reyna Castano is a 74 y.o. patient who presents for a Medicare Wellness Visit.    The following portions of the patient's history were reviewed and   updated as appropriate: {history reviewed:20406::\"allergies\",\"current medications\",\"past family history\",\"past medical history\",\"past social history\",\"past surgical history\",\"problem list\"}.    Compared to one year ago, the patient's physical   health is {better worse same:86740}.  Compared to one year ago, the patient's mental   health is {better worse same:40202}.    Recent Hospitalizations:  {Hospital Admission Status in the last 365 days:68027}    Current Medical Providers:  Patient Care Team:  Keturah Grewal MD as PCP - General (Family Medicine)  Toi Alejo MD as Consulting Physician (Ophthalmology)    Outpatient Medications Prior to Visit   Medication Sig Dispense Refill    Accu-Chek Softclix Lancets lancets 100 each by Other route Daily. Use as instructed 100 each 1    albuterol sulfate  (90 Base) MCG/ACT inhaler Inhale 2 puffs Every 4 (Four) Hours As Needed for Wheezing or Shortness of Air. 18 g 1    atorvastatin (LIPITOR) 10 MG tablet TAKE 1 TABLET BY MOUTH EVERY  NIGHT FOR HIGH CHOLESTEROL 90 tablet 3    Blood Glucose Monitoring Suppl (Contour Next Monitor) w/Device kit Use 1 Device Daily. E11.9 1 kit 0    Blood Glucose Monitoring Suppl (Contour Next Monitor) w/Device kit Use 1 Device Daily. 1 kit 0    carvedilol (COREG) 25 MG tablet TAKE 1 TABLET BY MOUTH TWICE  DAILY WITH MEALS 180 tablet 3    glucose blood (Contour Next Test) test strip Use to check blood sugar once daily.  E11.9 100 each 12    metFORMIN (GLUCOPHAGE) 500 MG tablet TAKE 1 TABLET TWICE DAILY 180 tablet 3    Tradjenta 5 MG tablet tablet TAKE 1 TABLET EVERY DAY FOR  DIABETES 90 tablet 3     No facility-administered medications prior to visit.     No opioid medication identified on active " "medication list. I have reviewed chart for other potential  high risk medication/s and harmful drug interactions in the elderly.      Aspirin is not on active medication list.  {ASPIRIN NOT ON MEDICATION LIST INDICATED/NOT INDICATED:70758}.    Patient Active Problem List   Diagnosis    Disorder associated with type 2 diabetes mellitus    Encounter for general adult medical examination without abnormal findings    Gastroesophageal reflux disease    Hypertension    Hypokalemia    Acute non-recurrent maxillary sinusitis    Retrolisthesis of vertebrae    Neural foraminal stenosis of lumbar spine    Morbidly obese    Anemia due to acute blood loss    Hay fever    Hematochezia    Encounter for immunization    Degenerative disc disease, lumbar    Encounter for screening mammogram for breast cancer    Need for vaccination    Medicare annual wellness visit, subsequent    COVID-19 virus infection    Acute URI    Type 2 diabetes mellitus with diabetic autonomic neuropathy, without long-term current use of insulin    Wheezing     Advance Care Planning {Advance Care Planning Hyperlink:23}Advance Directive is not on file.  {ACP Discussion, Advance Directive not in EMR:16342}            Objective   Vitals:    07/02/25 1057   BP: (!) 191/112   BP Location: Left arm   Patient Position: Sitting   Cuff Size: Adult   Pulse: 89   Temp: 98.1 °F (36.7 °C)   TempSrc: Temporal   SpO2: (!) 89%   Weight: 87.6 kg (193 lb 3.2 oz)   Height: 170.2 cm (67\")       Estimated body mass index is 30.26 kg/m² as calculated from the following:    Height as of this encounter: 170.2 cm (67\").    Weight as of this encounter: 87.6 kg (193 lb 3.2 oz).         {Help Text;  If you change Medicare Wellness reason for visit to a Welcome to Medicare reason for visit after the encounter has started, please add SmartPhrase .GAITBALANCE to your note for proper gait and balance documentation. This text will disappear after the note is signed:88008}       Does the " patient have evidence of cognitive impairment? {Yes/No:63072}                                                                                                Health  Risk Assessment    Smoking Status:  Social History     Tobacco Use   Smoking Status Never    Passive exposure: Never   Smokeless Tobacco Never     Alcohol Consumption:  Social History     Substance and Sexual Activity   Alcohol Use No    Comment: caffeine free drinks       Fall Risk Screen{Jump to Steadi Fall Risk Flowsheet:23}  STEADI Fall Risk Assessment was completed, and patient is at LOW risk for falls.Assessment completed on:7/2/2025    Depression Screening{Jump to PHQ SmartForm:23}   Little interest or pleasure in doing things? Not at all   Feeling down, depressed, or hopeless? Not at all   PHQ-2 Total Score 0      Health Habits and Functional and Cognitive Screening:      3/29/2024     8:00 AM   Functional & Cognitive Status   Do you have difficulty preparing food and eating? No    Do you have difficulty bathing yourself, getting dressed or grooming yourself? No    Do you have difficulty using the toilet? No    Do you have difficulty moving around from place to place? No    Do you have trouble with steps or getting out of a bed or a chair? No   Current Diet Well Balanced Diet   Dental Exam Up to date   Eye Exam Up to date   Do you need help using the phone?  No   Are you deaf or do you have serious difficulty hearing?  No   Do you need help to go to places out of walking distance? No   Do you need help shopping? No   Do you need help preparing meals?  No   Do you need help with housework?  No   Do you need help with laundry? No   Do you need help taking your medications? No   Do you ever drive or ride in a car without wearing a seat belt? No   Have you felt unusual fatigue (could be tiredness), stress, anger or loneliness in the last month? No    Who do you live with? Spouse   If you need help, do you have trouble finding someone available to  you? No   Do you have difficulty concentrating, remembering or making decisions? No       Data saved with a previous flowsheet row definition           Age-appropriate Screening Schedule:  Refer to the list below for future screening recommendations based on patient's age, sex and/or medical conditions. Orders for these recommended tests are listed in the plan section. The patient has been provided with a written plan.    Health Maintenance List  Health Maintenance   Topic Date Due    DXA SCAN  Never done    DIABETIC FOOT EXAM  Never done    ANNUAL WELLNESS VISIT  03/29/2025    URINE MICROALBUMIN-CREATININE RATIO (uACR)  03/29/2025    HEMOGLOBIN A1C  04/04/2025    Pneumococcal Vaccine 50+ (2 of 2 - PPSV23) 07/16/2025 (Originally 4/16/2016)    INFLUENZA VACCINE  09/30/2025 (Originally 7/1/2025)    COVID-19 Vaccine (4 - 2024-25 season) 12/31/2025 (Originally 9/1/2024)    RSV Vaccine - Adults (1 - 1-dose 75+ series) 12/31/2025 (Originally 8/8/2024)    TDAP/TD VACCINES (1 - Tdap) 12/31/2025 (Originally 8/8/1968)    ZOSTER VACCINE (1 of 2) 12/31/2025 (Originally 8/8/1999)    DIABETIC EYE EXAM  12/05/2025    COLORECTAL CANCER SCREENING  02/20/2026    HEPATITIS C SCREENING  Completed                                                                                                                                                CMS Preventative Services Quick Reference  Risk Factors Identified During Encounter  {Medicare Wellness Risk Factors:21376}    The above risks/problems have been discussed with the patient.  Pertinent information has been shared with the patient in the After Visit Summary.  An After Visit Summary and PPPS were made available to the patient.    Follow Up:{Wrapup  Review (Popup)  Advance Care Planning  Labs  CC  Problem List  Visit Diagnosis  Medications  Result Review  Imaging  [facility] Maintenance  Quality  BestPractice  SmartSets  SnapShot  Encounters  Notes  Media  Procedures :23}  "  Next Medicare Wellness visit to be scheduled in 1 year.         Additional E&M Note during same encounter follows:  Patient has additional, significant, and separately identifiable condition(s)/problem(s) that require work above and beyond the Medicare Wellness Visit     Chief Complaint  Primary Care Follow-Up    Subjective {Problem List  Visit Diagnosis   Encounters  Notes  Medications  Labs  Result Review Imaging  Media :23}{Help Text;  If performing a Preventative Medicine Visit (e.g. 94624) in addition to AWV, choose the 1st SmartList option below and document any ROS/PE performed.  If performing a separately identifiable E/M service (e.g. 91639), choose 2nd SmartLink option below. This information in red will not appear in the final note after note is signed:85216}  HPI  {AWV/Preventative Exam/EM Progress Note. Use this note if billing for additional Wellness Visit or EM exam in addition to AWV visit (Optional):[id number]}                Objective   Vital Signs:  BP (!) 191/112 (BP Location: Left arm, Patient Position: Sitting, Cuff Size: Adult)   Pulse 89   Temp 98.1 °F (36.7 °C) (Temporal)   Ht 170.2 cm (67\")   Wt 87.6 kg (193 lb 3.2 oz)   SpO2 (!) 89%   BMI 30.26 kg/m²   Physical Exam    The following data was reviewed by: Keturah Grewal MD on 07/02/2025:  {Data reviewed (Optional):30437:::1}  Common labs          10/4/2024    08:19   Common Labs   Glucose 123    BUN 13    Creatinine 0.86    Sodium 145    Potassium 4.7    Chloride 106    Calcium 9.8    Albumin 4.2    Total Bilirubin 0.5    Alkaline Phosphatase 81    AST (SGOT) 14    ALT (SGPT) 10    Total Cholesterol 131    Triglycerides 79    HDL Cholesterol 73    LDL Cholesterol  43    Hemoglobin A1C 6.00           Assessment and Plan {CC Problem List  Visit Diagnosis   ROS  Review (Popup)  Health Maintenance  Quality  BestPractice  Medications  SmartSets  SnapShot Encounters  Media :23}{Help Text; When performing an adult " Preventative Medicine Visit (e.g. 92400) using the optional SmartList below can help when documenting any age-appropriate advice given.  When using the SmartList review and update the information based on the unique discussion or advice given to the patient.  As a reminder, diagnosis code Z00.00 (nl exam) or Z00.01 (abnl exam), should be added when this service is performed.  Text will disappear once the note is signed:47738}Additional age appropriate preventative wellness advice topics were discussed during today's preventative wellness exam(some topics already addressed during AWV portion of the note above):    Physical Activity: Advised cardiovascular activity 150 minutes per week as tolerated. (example brisk walk for 30 minutes, 5 days a week).     Postmenopause         Type 2 diabetes mellitus with diabetic autonomic neuropathy, without long-term current use of insulin  {Diabetes (Optional):3347113406}         Primary hypertension  {Hypertension is (optional):6247152790}               {Time Spent (Optional):04725}  Follow Up {Instructions Charge Capture  Follow-up Communications :23}  No follow-ups on file.  Patient was given instructions and counseling regarding her condition or for health maintenance advice. Please see specific information pulled into the AVS if appropriate.    Answers submitted by the patient for this visit:  Diabetes Questionnaire (Submitted on 7/1/2025)  Chief Complaint: Diabetes problem  Below 70: occasionally

## 2025-07-02 NOTE — PROGRESS NOTES
Subjective   The ABCs of the Annual Wellness Visit  Medicare Wellness Visit      Reyna Castano is a 74 y.o. patient who presents for a Medicare Wellness Visit. Increased stress with recent long-term and stress with work prior to that.     The following portions of the patient's history were reviewed and   updated as appropriate: allergies, current medications, past family history, past medical history, past social history, past surgical history, and problem list.    Compared to one year ago, the patient's physical   health is the same.  Compared to one year ago, the patient's mental   health is the same.    Patient refuses EKG.    Recent Hospitalizations:  She was not admitted to the hospital during the last year.     Current Medical Providers:  Patient Care Team:  Keturah Grewal MD as PCP - General (Family Medicine)  Toi Alejo MD as Consulting Physician (Ophthalmology)    Outpatient Medications Prior to Visit   Medication Sig Dispense Refill    Accu-Chek Softclix Lancets lancets 100 each by Other route Daily. Use as instructed 100 each 1    albuterol sulfate  (90 Base) MCG/ACT inhaler Inhale 2 puffs Every 4 (Four) Hours As Needed for Wheezing or Shortness of Air. 18 g 1    atorvastatin (LIPITOR) 10 MG tablet TAKE 1 TABLET BY MOUTH EVERY  NIGHT FOR HIGH CHOLESTEROL 90 tablet 3    Blood Glucose Monitoring Suppl (Contour Next Monitor) w/Device kit Use 1 Device Daily. E11.9 1 kit 0    Blood Glucose Monitoring Suppl (Contour Next Monitor) w/Device kit Use 1 Device Daily. 1 kit 0    carvedilol (COREG) 25 MG tablet TAKE 1 TABLET BY MOUTH TWICE  DAILY WITH MEALS 180 tablet 3    glucose blood (Contour Next Test) test strip Use to check blood sugar once daily.  E11.9 100 each 12    metFORMIN (GLUCOPHAGE) 500 MG tablet TAKE 1 TABLET TWICE DAILY 180 tablet 3    Tradjenta 5 MG tablet tablet TAKE 1 TABLET EVERY DAY FOR  DIABETES 90 tablet 3     No facility-administered medications prior to visit.     No  "opioid medication identified on active medication list. I have reviewed chart for other potential  high risk medication/s and harmful drug interactions in the elderly.      Aspirin is not on active medication list.  Aspirin use is indicated based on review of current medical condition/s. Pros and cons of this therapy have been discussed with this patient. Benefits of this medication outweigh potential harm.  Patient has been instructed to start taking this medication..    Patient Active Problem List   Diagnosis    Disorder associated with type 2 diabetes mellitus    Encounter for general adult medical examination without abnormal findings    Gastroesophageal reflux disease    Hypertension    Hypokalemia    Acute non-recurrent maxillary sinusitis    Retrolisthesis of vertebrae    Neural foraminal stenosis of lumbar spine    Morbidly obese    Anemia due to acute blood loss    Hay fever    Hematochezia    Encounter for immunization    Degenerative disc disease, lumbar    Encounter for screening mammogram for breast cancer    Need for vaccination    Medicare annual wellness visit, subsequent    COVID-19 virus infection    Acute URI    Type 2 diabetes mellitus with diabetic autonomic neuropathy, without long-term current use of insulin    Wheezing    Postmenopause     Advance Care Planning Advance Directive is not on file.  ACP discussion was held with the patient during this visit. Patient does not have an advance directive, information provided.            Objective   Vitals:    07/02/25 1057 07/02/25 1103   BP: (!) 191/112 148/81  Comment: this morning at home   BP Location: Left arm    Patient Position: Sitting    Cuff Size: Adult    Pulse: 89 71   Temp: 98.1 °F (36.7 °C)    TempSrc: Temporal    SpO2: (!) 89%    Weight: 87.6 kg (193 lb 3.2 oz)    Height: 170.2 cm (67\")        Estimated body mass index is 30.26 kg/m² as calculated from the following:    Height as of this encounter: 170.2 cm (67\").    Weight as of this " encounter: 87.6 kg (193 lb 3.2 oz).                Does the patient have evidence of cognitive impairment? No                                                                                                Health  Risk Assessment    Smoking Status:  Social History     Tobacco Use   Smoking Status Never    Passive exposure: Never   Smokeless Tobacco Never     Alcohol Consumption:  Social History     Substance and Sexual Activity   Alcohol Use No    Comment: caffeine free drinks       Fall Risk Screen  STEADI Fall Risk Assessment was completed, and patient is at LOW risk for falls.Assessment completed on:2025    Depression Screening   Little interest or pleasure in doing things? Not at all   Feeling down, depressed, or hopeless? Not at all   PHQ-2 Total Score 0      Health Habits and Functional and Cognitive Screenin/2/2025    11:00 AM   Functional & Cognitive Status   Do you have difficulty preparing food and eating? No   Do you have difficulty bathing yourself, getting dressed or grooming yourself? No   Do you have difficulty using the toilet? No   Do you have difficulty moving around from place to place? No   Do you have trouble with steps or getting out of a bed or a chair? No   Current Diet Well Balanced Diet   Do you need help using the phone?  No   Are you deaf or do you have serious difficulty hearing?  No   Do you need help to go to places out of walking distance? No   Do you need help shopping? No   Do you need help preparing meals?  No   Do you need help with housework?  No   Do you need help with laundry? No   Do you need help taking your medications? No   Do you ever drive or ride in a car without wearing a seat belt? No   Have you felt unusual fatigue (could be tiredness), stress, anger or loneliness in the last month? Yes   Who do you live with? Spouse   If you need help, do you have trouble finding someone available to you? No   Do you have difficulty concentrating, remembering or making  decisions? No           Age-appropriate Screening Schedule:  Refer to the list below for future screening recommendations based on patient's age, sex and/or medical conditions. Orders for these recommended tests are listed in the plan section. The patient has been provided with a written plan.    Health Maintenance List  Health Maintenance   Topic Date Due    DIABETIC FOOT EXAM  Never done    URINE MICROALBUMIN-CREATININE RATIO (uACR)  03/29/2025    HEMOGLOBIN A1C  04/04/2025    DXA SCAN  07/02/2025 (Originally 1950)    Pneumococcal Vaccine 50+ (2 of 2 - PPSV23) 07/16/2025 (Originally 4/16/2016)    INFLUENZA VACCINE  09/30/2025 (Originally 7/1/2025)    COVID-19 Vaccine (4 - 2024-25 season) 12/31/2025 (Originally 9/1/2024)    RSV Vaccine - Adults (1 - 1-dose 75+ series) 12/31/2025 (Originally 8/8/2024)    TDAP/TD VACCINES (1 - Tdap) 12/31/2025 (Originally 8/8/1968)    ZOSTER VACCINE (1 of 2) 12/31/2025 (Originally 8/8/1999)    DIABETIC EYE EXAM  12/05/2025    COLORECTAL CANCER SCREENING  02/20/2026    ANNUAL WELLNESS VISIT  07/02/2026    HEPATITIS C SCREENING  Completed                                                                                                                                                CMS Preventative Services Quick Reference  Risk Factors Identified During Encounter  Inactivity/Sedentary: Patient was advised to exercise at least 150 minutes a week per CDC recommendations.  Dental Screening Recommended  Vision Screening Recommended    The above risks/problems have been discussed with the patient.  Pertinent information has been shared with the patient in the After Visit Summary.  An After Visit Summary and PPPS were made available to the patient.    Follow Up:   Next Medicare Wellness visit to be scheduled in 1 year.         Additional E&M Note during same encounter follows:  Patient has additional, significant, and separately identifiable condition(s)/problem(s) that require work above  "and beyond the Medicare Wellness Visit     Chief Complaint  Primary Care Follow-Up    Subjective   Diabetes  Visit type:  Follow-up  Diabetes type:  Type 2  MedicAlert ID: no    Associated symptoms:     no blurred vision, no chest pain, no foot paresthesias, no foot ulcerations, no polydipsia, no polyuria and no weight loss    Symptom course:  Resolved  Hypoglycemia symptoms:     no confusion, no speech difficulty, no sweats and no tremors    Treatment compliance:  All of the time  Monitoring regimen:     Home blood tests:  1-2 x per day  Highest range:  70-90  Current diet:  Generally healthy  Meal planning:  Carbohydrate counting  Exercise:  Daily  Eye exam current: yes    Sees podiatrist: no    Hypertension  Chronicity:  Chronic  Onset:  More than 1 year ago  Progression since onset:  Unchanged  Condition status:  Uncontrolled  Associated symptoms: no blurred vision, no chest pain, no palpitations, no peripheral edema, no shortness of breath and no sweats    Agents associated with hypertension:  No associated agents  Improvement on treatment:  Kelly Orellana is also being seen today for additional medical problem/s.    Review of Systems   Constitutional:  Negative for unexpected weight loss.   Eyes:  Negative for blurred vision.   Respiratory:  Negative for shortness of breath.    Cardiovascular:  Negative for chest pain and palpitations.   Endocrine: Negative for polydipsia and polyuria.   Neurological:  Negative for tremors, speech difficulty and confusion.              Objective   Vital Signs:  /81 Comment: this morning at home  Pulse 71   Temp 98.1 °F (36.7 °C) (Temporal)   Ht 170.2 cm (67\")   Wt 87.6 kg (193 lb 3.2 oz)   SpO2 (!) 89%   BMI 30.26 kg/m²   Physical Exam  Vitals and nursing note reviewed.   Constitutional:       General: She is not in acute distress.     Appearance: She is well-developed.   HENT:      Head: Normocephalic.   Eyes:      General: Lids are normal.      " Conjunctiva/sclera: Conjunctivae normal.   Neck:      Thyroid: No thyroid mass or thyromegaly.      Trachea: Trachea normal.   Cardiovascular:      Rate and Rhythm: Normal rate and regular rhythm.      Heart sounds: Normal heart sounds.   Pulmonary:      Effort: Pulmonary effort is normal.      Breath sounds: Normal breath sounds.   Abdominal:      Palpations: Abdomen is soft.   Musculoskeletal:      Cervical back: Normal range of motion.   Lymphadenopathy:      Cervical: No cervical adenopathy.   Skin:     General: Skin is warm and dry.   Neurological:      Mental Status: She is alert and oriented to person, place, and time.   Psychiatric:         Attention and Perception: She is attentive.         Mood and Affect: Mood normal.         Speech: Speech normal.         Behavior: Behavior normal.         The following data was reviewed by: Keturah Grewal MD on 07/02/2025:    Common labs          10/4/2024    08:19   Common Labs   Glucose 123    BUN 13    Creatinine 0.86    Sodium 145    Potassium 4.7    Chloride 106    Calcium 9.8    Albumin 4.2    Total Bilirubin 0.5    Alkaline Phosphatase 81    AST (SGOT) 14    ALT (SGPT) 10    Total Cholesterol 131    Triglycerides 79    HDL Cholesterol 73    LDL Cholesterol  43    Hemoglobin A1C 6.00           Assessment and Plan Additional age appropriate preventative wellness advice topics were discussed during today's preventative wellness exam(some topics already addressed during AWV portion of the note above):    Physical Activity: Advised cardiovascular activity 150 minutes per week as tolerated. (example brisk walk for 30 minutes, 5 days a week).     Postmenopause         Type 2 diabetes mellitus with diabetic autonomic neuropathy, without long-term current use of insulin  Diabetes is stable.   Continue current treatment regimen.  Diabetes will be reassessed in 6 months         Primary hypertension  Hypertension is uncontrolled  Continue current treatment  regimen.  Regular aerobic exercise.  Ambulatory blood pressure monitoring.  Blood pressure will be reassessedat the next regular appointment.         Medicare annual wellness visit, subsequent         Wheezing    Orders:    albuterol sulfate  (90 Base) MCG/ACT inhaler; Inhale 2 puffs Every 4 (Four) Hours As Needed for Wheezing or Shortness of Air.            Follow Up   No follow-ups on file.  Patient was given instructions and counseling regarding her condition or for health maintenance advice. Please see specific information pulled into the AVS if appropriate.    Answers submitted by the patient for this visit:  Diabetes Questionnaire (Submitted on 7/1/2025)  Chief Complaint: Diabetes problem  Below 70: occasionally